# Patient Record
Sex: FEMALE | Race: WHITE | Employment: FULL TIME | URBAN - METROPOLITAN AREA
[De-identification: names, ages, dates, MRNs, and addresses within clinical notes are randomized per-mention and may not be internally consistent; named-entity substitution may affect disease eponyms.]

---

## 2017-01-10 ENCOUNTER — HOSPITAL ENCOUNTER (EMERGENCY)
Age: 42
Discharge: HOME | End: 2017-01-10
Attending: EMERGENCY MEDICINE

## 2017-01-10 VITALS
WEIGHT: 141 LBS | OXYGEN SATURATION: 99 % | SYSTOLIC BLOOD PRESSURE: 117 MMHG | BODY MASS INDEX: 24.98 KG/M2 | RESPIRATION RATE: 18 BRPM | HEIGHT: 63 IN | TEMPERATURE: 98.2 F | DIASTOLIC BLOOD PRESSURE: 78 MMHG | HEART RATE: 53 BPM

## 2017-01-10 DIAGNOSIS — H81.10 BPPV (BENIGN PAROXYSMAL POSITIONAL VERTIGO), UNSPECIFIED LATERALITY: Primary | ICD-10-CM

## 2017-01-10 LAB
ANION GAP BLD CALC-SCNC: 20 MMOL/L (ref 5–15)
BILIRUB UR QL: NEGATIVE
BUN BLD-MCNC: 21 MG/DL (ref 9–20)
CA-I BLD-MCNC: 1.27 MMOL/L (ref 1.12–1.32)
CHLORIDE BLD-SCNC: 101 MMOL/L (ref 98–107)
CO2 BLD-SCNC: 24 MMOL/L (ref 21–32)
CREAT BLD-MCNC: 0.7 MG/DL (ref 0.6–1.3)
GLUCOSE BLD-MCNC: 102 MG/DL (ref 65–105)
GLUCOSE UR QL STRIP.AUTO: NEGATIVE MG/DL
HCG UR QL: NEGATIVE
HCT VFR BLD CALC: 41 % (ref 35–47)
HGB BLD-MCNC: 13.9 GM/DL (ref 11.5–16)
KETONES UR-MCNC: 40 MG/DL
LEUKOCYTE ESTERASE UR QL STRIP: NEGATIVE
NITRITE UR QL: NEGATIVE
PH UR: 6.5 [PH] (ref 5–8)
POTASSIUM BLD-SCNC: 4.2 MMOL/L (ref 3.5–5.1)
PROT UR QL: ABNORMAL MG/DL
RBC # UR STRIP: NEGATIVE /UL
SERVICE CMNT-IMP: ABNORMAL
SODIUM BLD-SCNC: 140 MMOL/L (ref 136–145)
SP GR UR: 1.02 (ref 1–1.03)
UROBILINOGEN UR QL: 0.2 EU/DL (ref 0.2–1)

## 2017-01-10 RX ORDER — ONDANSETRON 2 MG/ML
4 INJECTION INTRAMUSCULAR; INTRAVENOUS ONCE
Status: DISCONTINUED | OUTPATIENT
Start: 2017-01-10 | End: 2017-01-10

## 2017-01-10 RX ORDER — ONDANSETRON 8 MG/1
8 TABLET, ORALLY DISINTEGRATING ORAL
Qty: 12 TAB | Refills: 0 | Status: SHIPPED | OUTPATIENT
Start: 2017-01-10 | End: 2017-08-18 | Stop reason: ALTCHOICE

## 2017-01-10 RX ORDER — ONDANSETRON 2 MG/ML
4 INJECTION INTRAMUSCULAR; INTRAVENOUS ONCE
Status: COMPLETED | OUTPATIENT
Start: 2017-01-10 | End: 2017-01-10

## 2017-01-10 RX ADMIN — ONDANSETRON 4 MG: 2 INJECTION INTRAMUSCULAR; INTRAVENOUS at 14:16

## 2017-01-10 NOTE — LETTER
[facility] 
[address] [address] 
[phone] Work/School Note Date: 1/10/2017 To Whom It May concern: 
 
Ary Rodriguez was seen and treated today in the emergency room by the following provider(s): 
Attending Provider: Pam Frost MD 
Physician Assistant: Lisa Wood, [address] may return to work on 01/12/17. Sincerely, Lisa Wood, [address]

## 2017-01-10 NOTE — UC PROVIDER NOTE
Patient is a 46 y.o. female presenting with dizziness and nausea. The history is provided by the patient. Dizziness   This is a new problem. The current episode started 6 to 12 hours ago. The problem has not changed since onset. There was no focality noted. Pertinent negatives include no focal weakness, no loss of balance, no slurred speech, no memory loss, no auditory change and no mental status change. There has been no fever. Associated symptoms include vomiting and nausea. Pertinent negatives include no confusion and no headaches. Associated medical issues do not include trauma or seizures. Nausea    Pertinent negatives include no chills, no headaches and no headaches. Past Medical History   Diagnosis Date    SLAP tear of shoulder      left        Past Surgical History   Procedure Laterality Date    Hx gyn  2011     bladder sling    Hx tonsil and adenoidectomy      Hx orthopaedic       left wrist tendon    Hx orthopaedic  Nov 2013     L SLAP tear repair    [facility]  Nov 2013     [facility]         Family History   Problem Relation Age of Onset    Heart Disease Father      CHF    Heart Disease Maternal Grandfather     Stroke Paternal Grandfather         Social History     Social History    Marital status:      Spouse name: N/A    Number of children: N/A    Years of education: N/A     Occupational History    Not on file. Social History Main Topics    Smoking status: Never Smoker    Smokeless tobacco: Never Used    Alcohol use No    Drug use: No    Sexual activity: Yes     Partners: Male     Birth control/ protection: Surgical     Other Topics Concern    Not on file     Social History Narrative                ALLERGIES: Review of patient's allergies indicates no known allergies. Review of Systems   Constitutional: Negative for chills. HENT: Negative for congestion. Gastrointestinal: Positive for nausea and vomiting.    Neurological: Positive for dizziness. Negative for focal weakness, headaches and loss of balance. Psychiatric/Behavioral: Negative for confusion and memory loss. Vitals:    01/10/17 1318   BP: 117/78   Pulse: (!) 53   Resp: 18   Temp: 98.2 °F (36.8 °C)   SpO2: 99%   Weight: 64 kg (141 lb)   Height: 5' 3\" (1.6 m)       Physical Exam   Constitutional: She is oriented to person, place, and time. She appears well-developed and well-nourished. HENT:   Right Ear: External ear normal.   Left Ear: External ear normal.   Eyes: EOM are normal.   Cardiovascular: Normal rate and regular rhythm. Pulmonary/Chest: Effort normal and breath sounds normal.   Neurological: She is alert and oriented to person, place, and time. Skin: Skin is warm and dry. Psychiatric: She has a normal mood and affect. Her behavior is normal. Judgment and thought content normal.   Nursing note and vitals reviewed. MDM     Differential Diagnosis; Clinical Impression; Plan:     CLINICAL IMPRESSION:  BPPV (benign paroxysmal positional vertigo), unspecified laterality  (primary encounter diagnosis)    Plan:  1. NS IV bolus given - improvement noted  2. Zofran 8 mg UAD  3. Stay well hydrated  Risk of Significant Complications, Morbidity, and/or Mortality:   Presenting problems: Moderate  Diagnostic procedures: Moderate  Management options:   Moderate  Progress:   Patient progress:  Stable      Procedures

## 2017-02-01 ENCOUNTER — OFFICE VISIT (OUTPATIENT)
Dept: FAMILY MEDICINE CLINIC | Age: 42
End: 2017-02-01

## 2017-02-01 VITALS
WEIGHT: 141 LBS | OXYGEN SATURATION: 99 % | TEMPERATURE: 97.3 F | HEIGHT: 63 IN | HEART RATE: 60 BPM | BODY MASS INDEX: 24.98 KG/M2 | RESPIRATION RATE: 18 BRPM

## 2017-02-01 DIAGNOSIS — Z00.00 ROUTINE GENERAL MEDICAL EXAMINATION AT A HEALTH CARE FACILITY: Primary | ICD-10-CM

## 2017-02-01 DIAGNOSIS — F43.23 ADJUSTMENT DISORDER WITH MIXED ANXIETY AND DEPRESSED MOOD: ICD-10-CM

## 2017-02-01 LAB
BILIRUB UR QL STRIP: NEGATIVE
GLUCOSE UR-MCNC: NEGATIVE MG/DL
KETONES P FAST UR STRIP-MCNC: NEGATIVE MG/DL
PH UR STRIP: 5.5 [PH] (ref 4.6–8)
PROT UR QL STRIP: NEGATIVE MG/DL
SP GR UR STRIP: 1.01 (ref 1–1.03)
UA UROBILINOGEN AMB POC: NORMAL (ref 0.2–1)
URINALYSIS CLARITY POC: CLEAR
URINALYSIS COLOR POC: YELLOW
URINE BLOOD POC: NORMAL
URINE LEUKOCYTES POC: NEGATIVE
URINE NITRITES POC: NEGATIVE

## 2017-02-01 RX ORDER — MELATONIN
DAILY
COMMUNITY

## 2017-02-01 RX ORDER — ALPRAZOLAM 0.5 MG/1
0.5 TABLET ORAL
Qty: 30 TAB | Refills: 0 | Status: SHIPPED | OUTPATIENT
Start: 2017-02-01 | End: 2017-11-13 | Stop reason: SDUPTHER

## 2017-02-01 NOTE — MR AVS SNAPSHOT
Visit Information Date & Time Provider Department Dept. Phone Encounter #  
 2/1/2017  2:00 PM Deng Willis, [address] [phone] [id number] Your Appointments 2/8/2017  9:00 AM  
COMPLETE PHYSICAL with Deng Willis  [facility] [address] ([facility] Appt Note: r/s from 12/23/16 for cpe, will cancel this appt if her appt on 2/1/17 is cancelled [address] [phone]  
[phone]  
  
   
 [address] [address] Upcoming Health Maintenance Date Due DTaP/Tdap/Td series (1 - Tdap) 10/10/1998 PAP AKA CERVICAL CYTOLOGY 1/15/2016 INFLUENZA AGE 9 TO ADULT 8/1/2016 Allergies as of 2/1/2017  Review Complete On: 2/1/2017 By: Tien Merritt LPN No Known Allergies Current Immunizations  Never Reviewed No immunizations on file. Not reviewed this visit You Were Diagnosed With   
  
 Codes Comments Routine general medical examination at a health care facility    -  Primary ICD-10-CM: Z00.00 ICD-9-CM: V70.0 Adjustment disorder with mixed anxiety and depressed mood     ICD-10-CM: F43.23 
ICD-9-CM: 309.28 Vitals Pulse Temp Resp Height(growth percentile) Weight(growth percentile) LMP  
 60 97.3 °F (36.3 °C) (Oral) 18 5' 3\" (1.6 m) 141 lb (64 kg) 01/31/2017 (Approximate) SpO2 BMI OB Status Smoking Status 99% 24.98 kg/m2 Having regular periods Never Smoker Vitals History BMI and BSA Data Body Mass Index Body Surface Area 24.98 kg/m 2 1.69 m 2 Preferred Pharmacy Pharmacy Name Phone [id number] [id number], [address] [phone] Your Updated Medication List  
  
   
This list is accurate as of: 2/1/17  2:30 PM.  Always use your most recent med list.  
  
  
  
  
 ALPRAZolam 0.5 mg tablet Commonly known as:  Jihan Abdi Take 1 Tab by mouth nightly as needed for Anxiety. Max Daily Amount: 0.5 mg.  
  
 ondansetron 8 mg disintegrating tablet Commonly known as:  ZOFRAN ODT Take 1 Tab by mouth every eight (8) hours as needed for Nausea. SUDAFED PO Take  by mouth. VITAMIN D3 1,000 unit tablet Generic drug:  cholecalciferol Take  by mouth daily. Prescriptions Printed Refills ALPRAZolam (XANAX) 0.5 mg tablet 0 Sig: Take 1 Tab by mouth nightly as needed for Anxiety. Max Daily Amount: 0.5 mg.  
 Class: Print Route: Oral  
  
We Performed the Following AMB POC URINALYSIS DIP STICK AUTO W/ MICRO [44636 CPT(R)] CBC WITH AUTOMATED DIFF [10286 CPT(R)] LIPID PANEL [20017 CPT(R)] METABOLIC PANEL, COMPREHENSIVE [31858 CPT(R)] TSH 3RD GENERATION [95028 CPT(R)] VITAMIN D, 25 HYDROXY [id number] CPT(R)] Introducing [facility]! Dear Juventino Hernández: Thank you for requesting a ConforMIS account. Our records indicate that you already have an active ConforMIS account. You can access your account anytime at [email or web address] Did you know that you can access your hospital and ER discharge instructions at any time in ConforMIS? You can also review all of your test results from your hospital stay or ER visit. Additional Information If you have questions, please visit the Frequently Asked Questions section of the ConforMIS website at [email or web address] Remember, ConforMIS is NOT to be used for urgent needs. For medical emergencies, dial 911. Now available from your iPhone and Android! Please provide this summary of care documentation to your next provider. Your primary care clinician is listed as JESSICA KAYE. If you have any questions after today's visit, please call [phone].

## 2017-02-01 NOTE — PROGRESS NOTES
Subjective:   46 y.o. female for Well Woman Check. Her gyne and breast care is done elsewhere by her Ob-Gyne physician. Patient Active Problem List    Diagnosis Date Noted    Adjustment disorder with mixed anxiety and depressed mood 05/17/2011    Seasonal allergic rhinitis 05/05/2011     Current Outpatient Prescriptions   Medication Sig Dispense Refill    cholecalciferol (VITAMIN D3) 1,000 unit tablet Take  by mouth daily.  ALPRAZolam (XANAX) 0.5 mg tablet Take 1 Tab by mouth nightly as needed for Anxiety. Max Daily Amount: 0.5 mg. 30 Tab 0    PSEUDOEPHEDRINE HCL (SUDAFED PO) Take  by mouth.  ondansetron (ZOFRAN ODT) 8 mg disintegrating tablet Take 1 Tab by mouth every eight (8) hours as needed for Nausea. 12 Tab 0     No Known Allergies  Past Medical History   Diagnosis Date    SLAP tear of shoulder      left     Past Surgical History   Procedure Laterality Date    Hx gyn  2011     bladder sling    Hx tonsil and adenoidectomy      Hx orthopaedic       left wrist tendon    Hx orthopaedic  Nov 2013     L SLAP tear repair    H[facility]  Nov 2013     [facility]     Family History   Problem Relation Age of Onset    Heart Disease Father      CHF    Heart Disease Maternal Grandfather     Stroke Paternal Grandfather      Social History   Substance Use Topics    Smoking status: Never Smoker    Smokeless tobacco: Never Used    Alcohol use No         ROS: Feeling generally well. No TIA's or unusual headaches, no dysphagia. No prolonged cough. No dyspnea or chest pain on exertion. No abdominal pain, change in bowel habits, black or bloody stools. No urinary tract symptoms. No new or unusual musculoskeletal symptoms. Specific concerns today: has a erythematous dry area on her face for a month, used fungal cream without help  Requesting to refill her xanax, takes only 2-3 per month for anxiety. Objective:    The patient appears well, alert, oriented x 3, in no distress. Visit Vitals    Pulse 60    Temp 97.3 °F (36.3 °C) (Oral)    Resp 18    Ht 5' 3\" (1.6 m)    Wt 141 lb (64 kg)    LMP 01/31/2017 (Approximate)    SpO2 99%    BMI 24.98 kg/m2     ENT normal.  Neck supple. No adenopathy or thyromegaly. VIRGIE. Lungs are clear, good air entry, no wheezes, rhonchi or rales. S1 and S2 normal, no murmurs, regular rate and rhythm. Abdomen soft without tenderness, guarding, mass or organomegaly. Extremities show no edema, normal peripheral pulses. Neurological is normal, no focal findings. Breast and Pelvic exams are deferred. Assessment/Plan:   Well Woman  continue present plan    ICD-10-CM ICD-9-CM    1. Routine general medical examination at a health care facility F51.41 A52.6 METABOLIC PANEL, COMPREHENSIVE      TSH 3RD GENERATION      LIPID PANEL      VITAMIN D, 25 HYDROXY      CBC WITH AUTOMATED DIFF      AMB POC URINALYSIS DIP STICK AUTO W/ MICRO   2. Adjustment disorder with mixed anxiety and depressed mood F43.23 309.28 ALPRAZolam (XANAX) 0.5 mg tablet   await labs, refill medication  Advised to apply cortisone 10, call if not improved.   Pt was given an after visit summary which includes diagnosis, current medicines and vital and voiced understanding of treatment plan

## 2017-02-01 NOTE — PROGRESS NOTES
\"Reviewed record in preparation for visit and have obtained the necessary documentation\"  Chief Complaint   Patient presents with    Complete Physical    Rash     to right side of face        Patient presents in the office today for a complete physical     Patient also has complaints of \"dry skin,fungus\" to right side of face under her nose,recurrent for several months     Prescription for Xanax 0.5 mg called in to local [facility] pharmacy on file, per GABINO Sierra verbal order         1. Have you been to the ER, urgent care clinic since your last visit? Hospitalized since your last visit? No    2. Have you seen or consulted any other health care providers outside of the [address] [place] [address] since your last visit? Include any pap smears or colon screening.  No

## 2017-04-15 LAB
25(OH)D3+25(OH)D2 SERPL-MCNC: 27.3 NG/ML (ref 30–100)
ALBUMIN SERPL-MCNC: 4.7 G/DL (ref 3.5–5.5)
ALBUMIN/GLOB SERPL: 2 {RATIO} (ref 1.2–2.2)
ALP SERPL-CCNC: 50 IU/L (ref 39–117)
ALT SERPL-CCNC: 27 IU/L (ref 0–32)
AST SERPL-CCNC: 23 IU/L (ref 0–40)
BASOPHILS # BLD AUTO: 0 X10E3/UL (ref 0–0.2)
BASOPHILS NFR BLD AUTO: 0 %
BILIRUB SERPL-MCNC: 0.4 MG/DL (ref 0–1.2)
BUN SERPL-MCNC: 17 MG/DL (ref 6–20)
BUN/CREAT SERPL: 21 (ref 9–23)
CALCIUM SERPL-MCNC: 9.2 MG/DL (ref 8.7–10.2)
CHLORIDE SERPL-SCNC: 100 MMOL/L (ref 96–106)
CHOLEST SERPL-MCNC: 180 MG/DL (ref 100–199)
CO2 SERPL-SCNC: 21 MMOL/L (ref 18–29)
CREAT SERPL-MCNC: 0.82 MG/DL (ref 0.57–1)
EOSINOPHIL # BLD AUTO: 0.1 X10E3/UL (ref 0–0.4)
EOSINOPHIL NFR BLD AUTO: 1 %
ERYTHROCYTE [DISTWIDTH] IN BLOOD BY AUTOMATED COUNT: 13.2 % (ref 12.3–15.4)
GLOBULIN SER CALC-MCNC: 2.3 G/DL (ref 1.5–4.5)
GLUCOSE SERPL-MCNC: 95 MG/DL (ref 65–99)
HCT VFR BLD AUTO: 38.5 % (ref 34–46.6)
HDLC SERPL-MCNC: 84 MG/DL
HGB BLD-MCNC: 12.9 G/DL (ref 11.1–15.9)
IMM GRANULOCYTES # BLD: 0 X10E3/UL (ref 0–0.1)
IMM GRANULOCYTES NFR BLD: 0 %
INTERPRETATION, 910389: NORMAL
LDLC SERPL CALC-MCNC: 90 MG/DL (ref 0–99)
LYMPHOCYTES # BLD AUTO: 2.2 X10E3/UL (ref 0.7–3.1)
LYMPHOCYTES NFR BLD AUTO: 31 %
MCH RBC QN AUTO: 30.2 PG (ref 26.6–33)
MCHC RBC AUTO-ENTMCNC: 33.5 G/DL (ref 31.5–35.7)
MCV RBC AUTO: 90 FL (ref 79–97)
MONOCYTES # BLD AUTO: 0.5 X10E3/UL (ref 0.1–0.9)
MONOCYTES NFR BLD AUTO: 6 %
NEUTROPHILS # BLD AUTO: 4.3 X10E3/UL (ref 1.4–7)
NEUTROPHILS NFR BLD AUTO: 62 %
PLATELET # BLD AUTO: 270 X10E3/UL (ref 150–379)
POTASSIUM SERPL-SCNC: 4.5 MMOL/L (ref 3.5–5.2)
PROT SERPL-MCNC: 7 G/DL (ref 6–8.5)
RBC # BLD AUTO: 4.27 X10E6/UL (ref 3.77–5.28)
SODIUM SERPL-SCNC: 138 MMOL/L (ref 134–144)
TRIGL SERPL-MCNC: 30 MG/DL (ref 0–149)
TSH SERPL DL<=0.005 MIU/L-ACNC: 2.16 UIU/ML (ref 0.45–4.5)
VLDLC SERPL CALC-MCNC: 6 MG/DL (ref 5–40)
WBC # BLD AUTO: 7.1 X10E3/UL (ref 3.4–10.8)

## 2017-08-18 ENCOUNTER — OFFICE VISIT (OUTPATIENT)
Dept: FAMILY MEDICINE CLINIC | Age: 42
End: 2017-08-18

## 2017-08-18 VITALS
OXYGEN SATURATION: 97 % | HEART RATE: 68 BPM | RESPIRATION RATE: 18 BRPM | SYSTOLIC BLOOD PRESSURE: 121 MMHG | BODY MASS INDEX: 25.41 KG/M2 | TEMPERATURE: 97.3 F | HEIGHT: 63 IN | DIASTOLIC BLOOD PRESSURE: 70 MMHG | WEIGHT: 143.4 LBS

## 2017-08-18 DIAGNOSIS — N30.01 ACUTE CYSTITIS WITH HEMATURIA: ICD-10-CM

## 2017-08-18 DIAGNOSIS — R30.0 DYSURIA: Primary | ICD-10-CM

## 2017-08-18 LAB
BILIRUB UR QL STRIP: NEGATIVE
GLUCOSE UR-MCNC: NEGATIVE MG/DL
KETONES P FAST UR STRIP-MCNC: NEGATIVE MG/DL
PH UR STRIP: 6 [PH] (ref 4.6–8)
PROT UR QL STRIP: NEGATIVE MG/DL
SP GR UR STRIP: 1.01 (ref 1–1.03)
UA UROBILINOGEN AMB POC: NORMAL (ref 0.2–1)
URINALYSIS CLARITY POC: CLEAR
URINALYSIS COLOR POC: YELLOW
URINE BLOOD POC: NORMAL
URINE LEUKOCYTES POC: NORMAL
URINE NITRITES POC: NEGATIVE

## 2017-08-18 RX ORDER — CIPROFLOXACIN 250 MG/1
250 TABLET, FILM COATED ORAL EVERY 12 HOURS
Qty: 6 TAB | Refills: 0 | Status: SHIPPED | OUTPATIENT
Start: 2017-08-18 | End: 2017-08-21

## 2017-08-18 NOTE — MR AVS SNAPSHOT
Visit Information Date & Time Provider Department Dept. Phone Encounter #  
 8/18/2017  2:00 PM Lon Ghosh  [facility] [phone] [id number] Follow-up Instructions Return if symptoms worsen or fail to improve. Upcoming Health Maintenance Date Due DTaP/Tdap/Td series (1 - Tdap) 10/10/1998 PAP AKA CERVICAL CYTOLOGY 1/15/2016 INFLUENZA AGE 9 TO ADULT 8/1/2017 Allergies as of 8/18/2017  Review Complete On: 8/18/2017 By: Lon Ghosh NP No Known Allergies Current Immunizations  Never Reviewed No immunizations on file. Not reviewed this visit You Were Diagnosed With   
  
 Codes Comments Dysuria    -  Primary ICD-10-CM: R30.0 ICD-9-CM: 611. 1 Acute cystitis with hematuria     ICD-10-CM: N30.01 
ICD-9-CM: 595.0 Vitals BP Pulse Temp Resp Height(growth percentile) Weight(growth percentile) 121/70 (BP 1 Location: Left arm, BP Patient Position: Sitting) 68 97.3 °F (36.3 °C) (Oral) 18 5' 3\" (1.6 m) 143 lb 6.4 oz (65 kg) LMP SpO2 BMI OB Status Smoking Status 08/07/2017 (Exact Date) 97% 25.4 kg/m2 Having regular periods Never Smoker Vitals History BMI and BSA Data Body Mass Index Body Surface Area  
 25.4 kg/m 2 1.7 m 2 Preferred Pharmacy [facility]acy Name Phone [id number] [id number] [address] [phone] Your Updated Medication List  
  
   
This list is accurate as of: 8/18/17  2:35 PM.  Always use your most recent med list.  
  
  
  
  
 ALPRAZolam 0.5 mg tablet Commonly known as:  Murali Papa Take 1 Tab by mouth nightly as needed for Anxiety. Max Daily Amount: 0.5 mg.  
  
 ciprofloxacin HCl 250 mg tablet Commonly known as:  CIPRO Take 1 Tab by mouth every twelve (12) hours for 3 days. VITAMIN D3 1,000 unit tablet Generic drug:  cholecalciferol Take  by mouth daily. Prescriptions Sent to Pharmacy Refills  
 ciprofloxacin HCl (CIPRO) 250 mg tablet 0 Sig: Take 1 Tab by mouth every twelve (12) hours for 3 days. Class: Normal  
 Pharmacy: [facility], [address] [place] [address] Ph #: [phone] Route: Oral  
  
We Performed the Following AMB POC URINALYSIS DIP STICK AUTO W/O MICRO [28032 CPT(R)] CULTURE, URINE [id number] CPT(R)] Follow-up Instructions Return if symptoms worsen or fail to improve. Patient Instructions Urinary Tract Infection in Women: Care Instructions Your Care Instructions A urinary tract infection, or UTI, is a general term for an infection anywhere between the kidneys and the urethra (where urine comes out). Most UTIs are bladder infections. They often cause pain or burning when you urinate. UTIs are caused by bacteria and can be cured with antibiotics. Be sure to complete your treatment so that the infection goes away. Follow-up care is a key part of your treatment and safety. Be sure to make and go to all appointments, and call your doctor if you are having problems. It's also a good idea to know your test results and keep a list of the medicines you take. How can you care for yourself at home? · Take your antibiotics as directed. Do not stop taking them just because you feel better. You need to take the full course of antibiotics. · Drink extra water and other fluids for the next day or two. This may help wash out the bacteria that are causing the infection. (If you have kidney, heart, or liver disease and have to limit fluids, talk with your doctor before you increase your fluid intake.) · Avoid drinks that are carbonated or have caffeine. They can irritate the bladder. · Urinate often. Try to empty your bladder each time.  
· To relieve pain, take a hot bath or lay a heating pad set on low over your lower belly or genital area. Never go to sleep with a heating pad in place. To prevent UTIs · Drink plenty of water each day. This helps you urinate often, which clears bacteria from your system. (If you have kidney, heart, or liver disease and have to limit fluids, talk with your doctor before you increase your fluid intake.) · Urinate when you need to. · Urinate right after you have sex. · Change sanitary pads often. · Avoid douches, bubble baths, feminine hygiene sprays, and other feminine hygiene products that have deodorants. · After going to the bathroom, wipe from front to back. When should you call for help? Call your doctor now or seek immediate medical care if: · Symptoms such as fever, chills, nausea, or vomiting get worse or appear for the first time. · You have new pain in your back just below your rib cage. This is called flank pain. · There is new blood or pus in your urine. · You have any problems with your antibiotic medicine. Watch closely for changes in your health, and be sure to contact your doctor if: 
· You are not getting better after taking an antibiotic for 2 days. · Your symptoms go away but then come back. Where can you learn more? Go to http://cornell-zainab.info/. Enter R007 in the search box to learn more about \"Urinary Tract Infection in Women: Care Instructions. \" Current as of: November 28, 2016 Content Version: 11.3 © 6628-1823 [employer]ia. Care instructions adapted under license by [employer] (which disclaims liability or warranty for this information). If you have questions about a medical condition or this instruction, always ask your healthcare professional. [place] [address] any warranty or liability for your use of this information. Introducing [facility]! Dear Herson Hinojosa: Thank you for requesting a "[employer] account.   Our records indicate that you already have an active [employer] Entertainment account. You can access your account anytime at [email or web address] Did you know that you can access your hospital and ER discharge instructions at any time in [employer]? You can also review all of your test results from your hospital stay or ER visit. Additional Information If you have questions, please visit the Frequently Asked Questions section of the [employer] website at [email or web address] Remember, [employer] is NOT to be used for urgent needs. For medical emergencies, dial 911. Now available from your iPhone and Android! Please provide this summary of care documentation to your next provider. Your primary care clinician is listed as Umberto LERMA. If you have any questions after today's visit, please call [phone].

## 2017-08-18 NOTE — PATIENT INSTRUCTIONS
Urinary Tract Infection in Women: Care Instructions  Your Care Instructions    A urinary tract infection, or UTI, is a general term for an infection anywhere between the kidneys and the urethra (where urine comes out). Most UTIs are bladder infections. They often cause pain or burning when you urinate. UTIs are caused by bacteria and can be cured with antibiotics. Be sure to complete your treatment so that the infection goes away. Follow-up care is a key part of your treatment and safety. Be sure to make and go to all appointments, and call your doctor if you are having problems. It's also a good idea to know your test results and keep a list of the medicines you take. How can you care for yourself at home? · Take your antibiotics as directed. Do not stop taking them just because you feel better. You need to take the full course of antibiotics. · Drink extra water and other fluids for the next day or two. This may help wash out the bacteria that are causing the infection. (If you have kidney, heart, or liver disease and have to limit fluids, talk with your doctor before you increase your fluid intake.)  · Avoid drinks that are carbonated or have caffeine. They can irritate the bladder. · Urinate often. Try to empty your bladder each time. · To relieve pain, take a hot bath or lay a heating pad set on low over your lower belly or genital area. Never go to sleep with a heating pad in place. To prevent UTIs  · Drink plenty of water each day. This helps you urinate often, which clears bacteria from your system. (If you have kidney, heart, or liver disease and have to limit fluids, talk with your doctor before you increase your fluid intake.)  · Urinate when you need to. · Urinate right after you have sex. · Change sanitary pads often. · Avoid douches, bubble baths, feminine hygiene sprays, and other feminine hygiene products that have deodorants.   · After going to the bathroom, wipe from front to back.  When should you call for help? Call your doctor now or seek immediate medical care if:  · Symptoms such as fever, chills, nausea, or vomiting get worse or appear for the first time. · You have new pain in your back just below your rib cage. This is called flank pain. · There is new blood or pus in your urine. · You have any problems with your antibiotic medicine. Watch closely for changes in your health, and be sure to contact your doctor if:  · You are not getting better after taking an antibiotic for 2 days. · Your symptoms go away but then come back. Where can you learn more? Go to http://cornell-zainab.info/. Enter R632 in the search box to learn more about \"Urinary Tract Infection in Women: Care Instructions. \"  Current as of: November 28, 2016  Content Version: 11.3  © 4024-4235 [employer], [employer]ont[employer]. Care instructions adapted under license by [employer] (which disclaims liability or warranty for this information). If you have questions about a medical condition or this instruction, always ask your healthcare professional. [address] any warranty or liability for your use of this information.

## 2017-08-18 NOTE — PROGRESS NOTES
Chief Complaint   Patient presents with    Urinary Burning     urinary burning, frequent urination, pelvic pain with intercourse X 2 weeks     Vaginal Discharge     X 2 weeks      1. Have you been to the ER, urgent care clinic since your last visit? Hospitalized since your last visit? No    2. Have you seen or consulted any other health care providers outside of the [address] [place] [address] since your last visit? Include any pap smears or colon screening.  No

## 2017-08-19 LAB — BACTERIA UR CULT: ABNORMAL

## 2017-08-23 NOTE — PROGRESS NOTES
Subjective:      Lala Gaines is a 46 y.o. female who presents for dysuria and vaginal burning. Symptoms are worsening over time. Denies associated flank pain, nausea, vomiting, fever, chills or body aches. No history of recurrent UTI. Some associated pain with sex. Current Outpatient Prescriptions   Medication Sig Dispense Refill    cholecalciferol (VITAMIN D3) 1,000 unit tablet Take  by mouth daily.  ALPRAZolam (XANAX) 0.5 mg tablet Take 1 Tab by mouth nightly as needed for Anxiety. Max Daily Amount: 0.5 mg. 30 Tab 0     No Known Allergies    ROS:   Complete review of systems was reviewed with pertinent information listed in HPI. Objective:     Visit Vitals    /70 (BP 1 Location: Left arm, BP Patient Position: Sitting)    Pulse 68    Temp 97.3 °F (36.3 °C) (Oral)    Resp 18    Ht 5' 3\" (1.6 m)    Wt 143 lb 6.4 oz (65 kg)    LMP 08/07/2017 (Exact Date)    SpO2 97%    BMI 25.4 kg/m2       Vitals and Nurse Documentation reviewed. General:  alert, cooperative, no distress       Assessment/Plan:     Diagnoses and all orders for this visit:    1. Dysuria  -     AMB POC URINALYSIS DIP STICK AUTO W/O MICRO  -     CULTURE, URINE    2. Acute cystitis with hematuria  -     ciprofloxacin HCl (CIPRO) 250 mg tablet; Take 1 Tab by mouth every twelve (12) hours for 3 days. Treatment as above. Follow up if no improvement. Culture pending. Discussed expected course/resolution/complications of diagnosis in detail with patient.    Medication risks/benefits/costs/interactions/alternatives discussed with patient.    Pt was given an after visit summary which includes diagnoses, current medications & vitals.    Pt expressed understanding with the diagnosis and plan    Follow-up Disposition:  Return if symptoms worsen or fail to improve.

## 2017-08-24 ENCOUNTER — OFFICE VISIT (OUTPATIENT)
Dept: FAMILY MEDICINE CLINIC | Age: 42
End: 2017-08-24

## 2017-08-24 VITALS
BODY MASS INDEX: 25.34 KG/M2 | DIASTOLIC BLOOD PRESSURE: 60 MMHG | HEART RATE: 60 BPM | WEIGHT: 143 LBS | SYSTOLIC BLOOD PRESSURE: 116 MMHG | TEMPERATURE: 97.3 F | OXYGEN SATURATION: 98 % | HEIGHT: 63 IN | RESPIRATION RATE: 16 BRPM

## 2017-08-24 DIAGNOSIS — N30.00 ACUTE CYSTITIS WITHOUT HEMATURIA: Primary | ICD-10-CM

## 2017-08-24 RX ORDER — AMOXICILLIN AND CLAVULANATE POTASSIUM 500; 125 MG/1; MG/1
1 TABLET, FILM COATED ORAL 2 TIMES DAILY
Qty: 14 TAB | Refills: 0 | Status: SHIPPED | OUTPATIENT
Start: 2017-08-24 | End: 2017-08-31

## 2017-08-24 NOTE — PATIENT INSTRUCTIONS
Urinary Tract Infection in Women: Care Instructions  Your Care Instructions    A urinary tract infection, or UTI, is a general term for an infection anywhere between the kidneys and the urethra (where urine comes out). Most UTIs are bladder infections. They often cause pain or burning when you urinate. UTIs are caused by bacteria and can be cured with antibiotics. Be sure to complete your treatment so that the infection goes away. Follow-up care is a key part of your treatment and safety. Be sure to make and go to all appointments, and call your doctor if you are having problems. It's also a good idea to know your test results and keep a list of the medicines you take. How can you care for yourself at home? · Take your antibiotics as directed. Do not stop taking them just because you feel better. You need to take the full course of antibiotics. · Drink extra water and other fluids for the next day or two. This may help wash out the bacteria that are causing the infection. (If you have kidney, heart, or liver disease and have to limit fluids, talk with your doctor before you increase your fluid intake.)  · Avoid drinks that are carbonated or have caffeine. They can irritate the bladder. · Urinate often. Try to empty your bladder each time. · To relieve pain, take a hot bath or lay a heating pad set on low over your lower belly or genital area. Never go to sleep with a heating pad in place. To prevent UTIs  · Drink plenty of water each day. This helps you urinate often, which clears bacteria from your system. (If you have kidney, heart, or liver disease and have to limit fluids, talk with your doctor before you increase your fluid intake.)  · Urinate when you need to. · Urinate right after you have sex. · Change sanitary pads often. · Avoid douches, bubble baths, feminine hygiene sprays, and other feminine hygiene products that have deodorants.   · After going to the bathroom, wipe from front to back.  When should you call for help? Call your doctor now or seek immediate medical care if:  · Symptoms such as fever, chills, nausea, or vomiting get worse or appear for the first time. · You have new pain in your back just below your rib cage. This is called flank pain. · There is new blood or pus in your urine. · You have any problems with your antibiotic medicine. Watch closely for changes in your health, and be sure to contact your doctor if:  · You are not getting better after taking an antibiotic for 2 days. · Your symptoms go away but then come back. Where can you learn more? Go to http://cornell-zainab.info/. Enter B363 in the search box to learn more about \"Urinary Tract Infection in Women: Care Instructions. \"  Current as of: November 28, 2016  Content Version: 11.3  © 9257-7838 "[employer].", Steeplechase Networks. Care instructions adapted under license by Connect (which disclaims liability or warranty for this information). If you have questions about a medical condition or this instruction, always ask your healthcare professional. [address] any warranty or liability for your use of this information.

## 2017-08-24 NOTE — PROGRESS NOTES
Pt presents to office today for a follow up on Dysuria. Pt states she is done taking her Cipro, and still not feeling not feeling better. Chief Complaint   Patient presents with    Bladder Infection     Follow up . 1. Have you been to the ER, urgent care clinic since your last visit? Hospitalized since your last visit? No    2. Have you seen or consulted any other health care providers outside of the [address] [place] [address] since your last visit? Include any pap smears or colon screening.  No

## 2017-08-24 NOTE — MR AVS SNAPSHOT
Visit Information Date & Time Provider Department Dept. Phone Encounter #  
 8/24/2017  4:00 PM Anabell Strickland MD [address] [place] [address] [phone] [id number] Follow-up Instructions Return if symptoms worsen or fail to improve. Upcoming Health Maintenance Date Due DTaP/Tdap/Td series (1 - Tdap) 10/10/1998 PAP AKA CERVICAL CYTOLOGY 1/15/2016 INFLUENZA AGE 9 TO ADULT 8/1/2017 Allergies as of 8/24/2017  Review Complete On: 8/24/2017 By: Anabell Strickland MD  
 No Known Allergies Current Immunizations  Never Reviewed No immunizations on file. Not reviewed this visit You Were Diagnosed With   
  
 Codes Comments Acute cystitis without hematuria    -  Primary ICD-10-CM: N30.00 ICD-9-CM: 595.0 Vitals BP Pulse Temp Resp Height(growth percentile) Weight(growth percentile) 116/60 60 97.3 °F (36.3 °C) (Oral) 16 5' 3\" (1.6 m) 143 lb (64.9 kg) LMP SpO2 BMI OB Status Smoking Status 08/07/2017 (Exact Date) 98% 25.33 kg/m2 Having regular periods Never Smoker Vitals History BMI and BSA Data Body Mass Index Body Surface Area  
 25.33 kg/m 2 1.7 m 2 Preferred Pharmacy Pharmacy Name Phone [id number], [address] [facility] [phone] Your Updated Medication List  
  
   
This list is accurate as of: 8/24/17  4:42 PM.  Always use your most recent med list.  
  
  
  
  
 ALPRAZolam 0.5 mg tablet Commonly known as:  Rula Hollins Take 1 Tab by mouth nightly as needed for Anxiety. Max Daily Amount: 0.5 mg.  
  
 amoxicillin-clavulanate 500-125 mg per tablet Commonly known as:  AUGMENTIN Take 1 Tab by mouth two (2) times a day for 7 days. VITAMIN D3 1,000 unit tablet Generic drug:  cholecalciferol Take  by mouth daily. Prescriptions Sent to Pharmacy Refills amoxicillin-clavulanate (AUGMENTIN) 500-125 mg per tablet 0 Sig: Take 1 Tab by mouth two (2) times a day for 7 days. Class: Normal  
 Pharmacy: [id number] [place], [address] [place] [address] #: [phone] Route: Oral  
  
We Performed the Following AMB POC URINALYSIS DIP STICK AUTO W/O MICRO [68326 CPT(R)] Follow-up Instructions Return if symptoms worsen or fail to improve. Patient Instructions Urinary Tract Infection in Women: Care Instructions Your Care Instructions A urinary tract infection, or UTI, is a general term for an infection anywhere between the kidneys and the urethra (where urine comes out). Most UTIs are bladder infections. They often cause pain or burning when you urinate. UTIs are caused by bacteria and can be cured with antibiotics. Be sure to complete your treatment so that the infection goes away. Follow-up care is a key part of your treatment and safety. Be sure to make and go to all appointments, and call your doctor if you are having problems. It's also a good idea to know your test results and keep a list of the medicines you take. How can you care for yourself at home? · Take your antibiotics as directed. Do not stop taking them just because you feel better. You need to take the full course of antibiotics. · Drink extra water and other fluids for the next day or two. This may help wash out the bacteria that are causing the infection. (If you have kidney, heart, or liver disease and have to limit fluids, talk with your doctor before you increase your fluid intake.) · Avoid drinks that are carbonated or have caffeine. They can irritate the bladder. · Urinate often. Try to empty your bladder each time. · To relieve pain, take a hot bath or lay a heating pad set on low over your lower belly or genital area. Never go to sleep with a heating pad in place. To prevent UTIs · Drink plenty of water each day. This helps you urinate often, which clears bacteria from your system. (If you have kidney, heart, or liver disease and have to limit fluids, talk with your doctor before you increase your fluid intake.) · Urinate when you need to. · Urinate right after you have sex. · Change sanitary pads often. · Avoid douches, bubble baths, feminine hygiene sprays, and other feminine hygiene products that have deodorants. · After going to the bathroom, wipe from front to back. When should you call for help? Call your doctor now or seek immediate medical care if: · Symptoms such as fever, chills, nausea, or vomiting get worse or appear for the first time. · You have new pain in your back just below your rib cage. This is called flank pain. · There is new blood or pus in your urine. · You have any problems with your antibiotic medicine. Watch closely for changes in your health, and be sure to contact your doctor if: 
· You are not getting better after taking an antibiotic for 2 days. · Your symptoms go away but then come back. Where can you learn more? Go to http://cornell-zainab.info/. Enter X343 in the search box to learn more about \"Urinary Tract Infection in Women: Care Instructions. \" Current as of: November 28, 2016 Content Version: 11.3 © 1210-8261 Haversack. Care instructions adapted under license by DoNation (which disclaims liability or warranty for this information). If you have questions about a medical condition or this instruction, always ask your healthcare professional. [place] [address] any warranty or liability for your use of this information. Introducing [facility] & HEALTH SERVICES! Dear Denise Llanos: Thank you for requesting a Lynxx Innovations account. Our records indicate that you already have an active Lyn[employer] Innov[employer] account. You can access your account anytime at [email or web address] Did you know that you can access your hospital and ER discharge instructions at any time in BlueRoads? You can also review all of your test results from your hospital stay or ER visit. Additional Information If you have questions, please visit the Frequently Asked Questions section of the BlueRoads website at [email or web address] Remember, BlueRoads is NOT to be used for urgent needs. For medical emergencies, dial 911. Now available from your iPhone and Android! Please provide this summary of care documentation to your next provider. Your primary care clinician is listed as Sourav LERMA. If you have any questions after today's visit, please call [phone].

## 2017-08-24 NOTE — PROGRESS NOTES
[facility] Note      Subjective:     Chief Complaint   Patient presents with    Bladder Infection     Follow up . Braeden Tavarez is a 46y.o. year old female who presents for evaluation of the following:    Dysuria: about one week of symptoms. Seen by provider and treated for UTI with ciprofloxacin x 3 days which patient completed with no change in symptoms. Culture with GBS on chart review. - Endorses vaginal discharge present before symptom onset. - Denies flank pain, fever, chills rash., medication allergy, concern for pregnancy. Review of Systems   Per HPI    Past Medical History:   Diagnosis Date    SLAP tear of shoulder     left        Social History     Social History    Marital status:      Spouse name: N/A    Number of children: N/A    Years of education: N/A     Occupational History    Not on file. Social History Main Topics    Smoking status: Never Smoker    Smokeless tobacco: Never Used    Alcohol use Yes    Drug use: No    Sexual activity: Yes     Partners: Male     Birth control/ protection: Surgical     Other Topics Concern    Not on file     Social History Narrative         Objective:     Vitals:    08/24/17 1614   BP: 116/60   Pulse: 60   Resp: 16   Temp: 97.3 °F (36.3 °C)   TempSrc: Oral   SpO2: 98%   Weight: 143 lb (64.9 kg)   Height: 5' 3\" (1.6 m)       Physical Examination:  General: Alert, cooperative, no distress, appears stated age. Eyes: Conjunctivae/corneas clear. PERRL, EOMs intact. Ears: Normal external ear canals both ears. Neck: Supple  Back: Symmetric, no curvature. ROM normal. No CVA tenderness. Lungs: Clear to auscultation bilaterally. Normal inspiratory and expiratory ratio. Heart: Regular rate and rhythm, S1, S2 normal, no murmur, click, rub or gallop. Abdomen: Soft, Mild suprapubic tenderness. Bowel sounds normal.  Extremities: Extremities normal, atraumatic, no cyanosis or edema.   Pulses: 2+ and symmetric all extremities. Skin: Skin color, texture, turgor normal. No rashes or lesions      Assessment/ Plan:   Diagnoses and all orders for this visit:    1. Acute cystitis without hematuria: Will treat for GBS with Augmentin full course. - Urine dip not available in office so will confirm with culture since may have been contaminant.   - Patient to RTC if no improvement in sx  -     amoxicillin-clavulanate (AUGMENTIN) 500-125 mg per tablet; Take 1 Tab by mouth two (2) times a day for 7 days.  -     AMB POC URINALYSIS DIP STICK AUTO W/O MICRO  -     CULTURE, URINE         I have discussed the diagnosis with the patient and the intended plan as seen in the above orders. The patient has received an after-visit summary and questions were answered concerning future plans. I have discussed medication side effects and warnings with the patient as well. Follow-up Disposition:  Return if symptoms worsen or fail to improve.       Signed,    Osvaldo Reis MD  8/24/2017

## 2017-08-25 LAB — BACTERIA UR CULT: ABNORMAL

## 2017-11-13 DIAGNOSIS — F43.23 ADJUSTMENT DISORDER WITH MIXED ANXIETY AND DEPRESSED MOOD: ICD-10-CM

## 2017-11-13 RX ORDER — ALPRAZOLAM 0.5 MG/1
0.5 TABLET ORAL
Qty: 30 TAB | Refills: 0 | Status: SHIPPED | OUTPATIENT
Start: 2017-11-13 | End: 2018-11-28 | Stop reason: SDUPTHER

## 2017-11-13 NOTE — TELEPHONE ENCOUNTER
[phone]   Pharmacy on file verified   Refill request:  Requested Prescriptions     Pending Prescriptions Disp Refills    ALPRAZolam (XANAX) 0.5 mg tablet 30 Tab 0     Sig: Take 1 Tab by mouth nightly as needed for Anxiety.  Max Daily Amount: 0.5 mg.

## 2018-02-26 ENCOUNTER — OFFICE VISIT (OUTPATIENT)
Dept: FAMILY MEDICINE CLINIC | Age: 43
End: 2018-02-26

## 2018-02-26 VITALS
SYSTOLIC BLOOD PRESSURE: 113 MMHG | DIASTOLIC BLOOD PRESSURE: 74 MMHG | HEART RATE: 58 BPM | RESPIRATION RATE: 18 BRPM | TEMPERATURE: 98.2 F | OXYGEN SATURATION: 99 % | HEIGHT: 63 IN | WEIGHT: 140.6 LBS | BODY MASS INDEX: 24.91 KG/M2

## 2018-02-26 DIAGNOSIS — J01.00 ACUTE MAXILLARY SINUSITIS, RECURRENCE NOT SPECIFIED: Primary | ICD-10-CM

## 2018-02-26 RX ORDER — ESCITALOPRAM OXALATE 10 MG/1
TABLET ORAL
Refills: 3 | COMMUNITY
Start: 2018-02-15 | End: 2019-01-18

## 2018-02-26 NOTE — MR AVS SNAPSHOT
[address] 
[phone] Patient: Justin Dia MRN: [id number] :1975 Visit Information Date & Time Provider Department Dept. Phone Encounter #  
 2018  8:00 AM Tiff Landry  [facility] [phone] [id number] Follow-up Instructions Return if symptoms worsen or fail to improve, for Follow Up. Upcoming Health Maintenance Date Due DTaP/Tdap/Td series (1 - Tdap) 10/10/1998 PAP AKA CERVICAL CYTOLOGY 1/15/2016 Influenza Age 5 to Adult 2017 Allergies as of 2018  Review Complete On: 2018 By: Reena Chow LPN No Known Allergies Current Immunizations  Never Reviewed No immunizations on file. Not reviewed this visit You Were Diagnosed With   
  
 Codes Comments Acute maxillary sinusitis, recurrence not specified    -  Primary ICD-10-CM: J01.00 ICD-9-CM: 461.0 Vitals BP Pulse Temp Resp Height(growth percentile) Weight(growth percentile) 113/74 (BP 1 Location: Left arm, BP Patient Position: Sitting) (!) 58 98.2 °F (36.8 °C) (Oral) 18 5' 3\" (1.6 m) 140 lb 9.6 oz (63.8 kg) LMP SpO2 BMI OB Status Smoking Status 2018 (Exact Date) 99% 24.91 kg/m2 Having regular periods Never Smoker BMI and BSA Data Body Mass Index Body Surface Area 24.91 kg/m 2 1.68 m 2 Preferred Pharmacy Pharmacy Name Phone [id number], [address] [facility] [phone] Your Updated Medication List  
  
   
This list is accurate as of 18  8:15 AM.  Always use your most recent med list.  
  
  
  
  
 ALPRAZolam 0.5 mg tablet Commonly known as:  Margaretta Ditch Take 1 Tab by mouth nightly as needed for Anxiety. Max Daily Amount: 0.5 mg.  
  
 escitalopram oxalate 10 mg tablet Commonly known as:  Melvinia Calk TAKE 1 TABLET BY MOUTH DAILY VITAMIN D3 1,000 unit tablet Generic drug:  cholecalciferol Take  by mouth daily. Follow-up Instructions Return if symptoms worsen or fail to improve, for Follow Up. Patient Instructions For your symptoms: Your symptoms may improve with an oral antihistamine. These are available over the counter and include: 
Loratadine/claritin Cetirizine/Zyrtec Fexofenadine/Allegra Levocetirizine/Xyzal 
 
· Your symptoms may improve with a nasal steroid. These are available over the counter and include: · Flonase (aka fluticasone) · Nasocort (aka triamcinolone) · Nasonex (aka mometasone) · Rhinocort (aka budesonide) · Increase fluid intake, especially water to thin mucous and boost the immune system. · Avoid sugar and dairy while congested since they thicken mucous. · Get plenty of rest!   
· Gargle 3 times daily and as needed in Listerine or warm salt water vinegar solutions (1 tsp salt, 1 tsp vinegar in 1 cup lukewarm water.) · Use OTC nasal saline spray up each nostril four times daily. You could also consider using a netipot with distilled water. · Use humidifier at bedtime. · Use OTC Mucinex 600 mg twice daily to loosen mucous. · Use OTC Tylenol (up to 650mg every 6 hours) or Ibuprofen (up to 800 mg every 8 hours) as needed for pain, fever or headaches. Return to the doctor for evaluation: · If mucous is consistently discolored yellow or green throughout the day for more than a week · If you develop worsening facial pain · If you develop a fever that will not go away · If your symptoms worsen instead of improve Saline Nasal Washes: Care Instructions Your Care Instructions Saline nasal washes help keep the nasal passages open by washing out thick or dried mucus. This simple remedy can help relieve symptoms of allergies, sinusitis, and colds.  It also can make the nose feel more comfortable by keeping the mucous membranes moist. You may notice a little burning sensation in your nose the first few times you use the solution, but this usually gets better in a few days. Follow-up care is a key part of your treatment and safety. Be sure to make and go to all appointments, and call your doctor if you are having problems. It's also a good idea to know your test results and keep a list of the medicines you take. How can you care for yourself at home? · You can buy premixed saline solution in a squeeze bottle or other sinus rinse products at a drugstore. Read and follow the instructions on the label. · You also can make your own saline solution by adding 1 teaspoon of salt and 1 teaspoon of baking soda to 2 cups of distilled water. · If you use a homemade solution, pour a small amount into a clean bowl. Using a rubber bulb syringe, squeeze the syringe and place the tip in the salt water. Pull a small amount of the salt water into the syringe by relaxing your hand. · Sit down with your head tilted slightly back. Do not lie down. Put the tip of the bulb syringe or the squeeze bottle a little way into one of your nostrils. Gently drip or squirt a few drops into the nostril. Repeat with the other nostril. Some sneezing and gagging are normal at first. 
· Gently blow your nose. · Wipe the syringe or bottle tip clean after each use. · Repeat this 2 or 3 times a day. · Use nasal washes gently if you have nosebleeds often. When should you call for help? Watch closely for changes in your health, and be sure to contact your doctor if: 
? · You often get nosebleeds. ? · You have problems doing the nasal washes. Where can you learn more? Go to http://cornell-zainab.info/. Enter [id number] in the search box to learn more about \"Saline Nasal Washes: Care Instructions. \" Current as of: May 12, 2017 Content Version: 11.4 © 0271-5564 Cellmemore.  Care instructions adapted under license by [address] (which disclaims liability or warranty for this information). If you have questions about a medical condition or this instruction, always ask your healthcare professional. [address] any warranty or liability for your use of this information. Sinusitis: Care Instructions Your Care Instructions Sinusitis is an infection of the lining of the sinus cavities in your head. Sinusitis often follows a cold. It causes pain and pressure in your head and face. In most cases, sinusitis gets better on its own in 1 to 2 weeks. But some mild symptoms may last for several weeks. Sometimes antibiotics are needed. Follow-up care is a key part of your treatment and safety. Be sure to make and go to all appointments, and call your doctor if you are having problems. It's also a good idea to know your test results and keep a list of the medicines you take. How can you care for yourself at home? · Take an over-the-counter pain medicine, such as acetaminophen (Tylenol), ibuprofen (Advil, Motrin), or naproxen (Aleve). Read and follow all instructions on the label. · If the doctor prescribed antibiotics, take them as directed. Do not stop taking them just because you feel better. You need to take the full course of antibiotics. · Be careful when taking over-the-counter cold or flu medicines and Tylenol at the same time. Many of these medicines have acetaminophen, which is Tylenol. Read the labels to make sure that you are not taking more than the recommended dose. Too much acetaminophen (Tylenol) can be harmful. · Breathe warm, moist air from a steamy shower, a hot bath, or a sink filled with hot water. Avoid cold, dry air. Using a humidifier in your home may help. Follow the directions for cleaning the machine. · Use saline (saltwater) nasal washes to help keep your nasal passages open and wash out mucus and bacteria.  You can buy saline nose drops at a grocery store or drugstore. Or you can make your own at home by adding 1 teaspoon of salt and 1 teaspoon of baking soda to 2 cups of distilled water. If you make your own, fill a bulb syringe with the solution, insert the tip into your nostril, and squeeze gently. Richard Nokomis your nose. · Put a hot, wet towel or a warm gel pack on your face 3 or 4 times a day for 5 to 10 minutes each time. · Try a decongestant nasal spray like oxymetazoline (Afrin). Do not use it for more than 3 days in a row. Using it for more than 3 days can make your congestion worse. When should you call for help? Call your doctor now or seek immediate medical care if: 
? · You have new or worse swelling or redness in your face or around your eyes. ? · You have a new or higher fever. ? Watch closely for changes in your health, and be sure to contact your doctor if: 
? · You have new or worse facial pain. ? · The mucus from your nose becomes thicker (like pus) or has new blood in it. ? · You are not getting better as expected. Where can you learn more? Go to http://cornell-zainab.info/. Enter J519 in the search box to learn more about \"Sinusitis: Care Instructions. \" Current as of: May 12, 2017 Content Version: 11.4 © 8300-3973 Epom. Care instructions adapted under license by Net Orange (which disclaims liability or warranty for this information). If you have questions about a medical condition or this instruction, always ask your healthcare professional. [place] [address] any warranty or liability for your use of this information. Introducing [facility] & HEALTH SERVICES! Dear Josette: Thank you for requesting a Transera Communications account. Our records indicate that you already have an active Transera Communications account. You can access your account anytime at [email or web address] Did you know that you can access your hospital and ER discharge instructions at any time in Yu Rong? You can also review all of your test results from your hospital stay or ER visit. Additional Information If you have questions, please visit the Frequently Asked Questions section of the Yu Rong website at [email or web address] Remember, Yu Rong is NOT to be used for urgent needs. For medical emergencies, dial 911. Now available from your iPhone and Android! Please provide this summary of care documentation to your next provider. Your primary care clinician is listed as Lefty LERMA. If you have any questions after today's visit, please call [phone].

## 2018-02-26 NOTE — PROGRESS NOTES
[facility] Note      Subjective:     Chief Complaint   Patient presents with    Sore Throat    Cough     cough is non productive with some chest congestion    Nasal Congestion     Janie Rice is a 38y.o. year old female who presents for evaluation of the following:    Cough  Onset 3 days ago  Associated fatigue, chest burning while coughing, sinus pressure, nasal congestion Treatment: Rest, NyQuil  Endorses sick contact is daughter with similar symptoms diagnosed as a virus  Denies fever, sweats, shortness of breath, nausea, vomiting, diarrhea, rash, headache      Review of Systems   Pertinent positives and negative per HPI. All other systems  reviewed are negative for a Comprehensive ROS (10+). Past Medical History:   Diagnosis Date    SLAP tear of shoulder     left        Social History     Social History    Marital status:      Spouse name: N/A    Number of children: N/A    Years of education: N/A     Occupational History    Not on file. Social History Main Topics    Smoking status: Never Smoker    Smokeless tobacco: Never Used    Alcohol use Yes    Drug use: No    Sexual activity: Yes     Partners: Male     Birth control/ protection: Surgical     Other Topics Concern    Not on file     Social History Narrative       Current Outpatient Prescriptions   Medication Sig    escitalopram oxalate (LEXAPRO) 10 mg tablet TAKE 1 TABLET BY MOUTH DAILY    ALPRAZolam (XANAX) 0.5 mg tablet Take 1 Tab by mouth nightly as needed for Anxiety. Max Daily Amount: 0.5 mg.    cholecalciferol (VITAMIN D3) 1,000 unit tablet Take  by mouth daily. No current facility-administered medications for this visit.           Objective:     Vitals:    02/26/18 0759   BP: 113/74   Pulse: (!) 58   Resp: 18   Temp: 98.2 °F (36.8 °C)   TempSrc: Oral   SpO2: 99%   Weight: 140 lb 9.6 oz (63.8 kg)   Height: 5' 3\" (1.6 m)       Physical Examination:  General: Alert, cooperative, no distress, appears stated age. Eyes: Conjunctivae/corneas clear. PERRL, EOMs intact. Ears: Normal external ear canals both ears. TM clear and mobile bilaterally  Nose: Nares normal. Septum midline. Mucosa normal.  Maxillary sinus tenderness. Mouth/Throat: Lips, mucosa, and tongue normal. Teeth and gums normal.  No oropharyngeal erythema. No tonsillar tissue appreciated. Neck: Supple, symmetrical, trachea midline, no adenopathy. No thyroid enlargement/tenderness/nodules  Back: Symmetric, no curvature. ROM normal. No CVA tenderness. Lungs: Clear to auscultation bilaterally. Normal inspiratory and expiratory ratio. Heart: Regular rate and rhythm, S1, S2 normal, no murmur, click, rub or gallop. Abdomen: Soft, non-tender. Bowel sounds normal. No masses or organomegaly. Extremities: Extremities normal, atraumatic, no cyanosis or edema. Pulses: 2+ and symmetric all extremities. Skin: Skin color, texture, turgor normal. No rashes or lesions on exposed skin. Lymph nodes: Cervical, supraclavicular nodes normal.  Neurologic: CNII-XII intact. Strength 5/5 grossly. Sensation and reflexes normal throughout. No visits with results within 3 Month(s) from this visit. Latest known visit with results is:    Office Visit on 08/24/2017   Component Date Value Ref Range Status    Urine Culture, Routine 08/24/2017 *  Final                    Value:Beta hemolytic Streptococcus, group B  10,000-25,000 colony forming units per mL      Comment: Penicillin and ampicillin are drugs of choice for treatment of  beta-hemolytic streptococcal infections. Susceptibility testing of  penicillins and other beta-lactam agents approved by the FDA for  treatment of beta-hemolytic streptococcal infections need not be  performed routinely because nonsusceptible isolates are extremely  rare in any beta-hemolytic streptococcus and have not been reported  for Streptococcus pyogenes (group A).  (CLSI 2011)             Assessment/ Plan: Diagnoses and all orders for this visit:    1. Acute maxillary sinusitis, recurrence not specified      Trial otc meds for symptom relief discussed and listed in patient instructions- mucinex + antihistamine + sinus rinse + NSAID + humidifier  prn    If no improvement after 7 days of symptoms, by this Friday 3/2, will send in antibiotic for sinusitis. I have discussed the diagnosis with the patient and the intended plan as seen in the above orders. The patient has received an after-visit summary and questions were answered concerning future plans. I have discussed medication side effects and warnings with the patient as well. Follow-up Disposition:  Return if symptoms worsen or fail to improve, for Follow Up.       Signed,    Parris Frankel, MD  2/26/2018

## 2018-02-26 NOTE — PROGRESS NOTES
Chief Complaint   Patient presents with    Sore Throat    Cough     cough is non productive with some chest congestion    Nasal Congestion     1. Have you been to the ER, urgent care clinic since your last visit? Hospitalized since your last visit? No    2. Have you seen or consulted any other health care providers outside of the [facility] [place] since your last visit? Include any pap smears or colon screening.  No

## 2018-02-26 NOTE — PATIENT INSTRUCTIONS
For your symptoms: Your symptoms may improve with an oral antihistamine. These are available over the counter and include:  Loratadine/claritin  Cetirizine/Zyrtec  Fexofenadine/Allegra  Levocetirizine/Xyzal    · Your symptoms may improve with a nasal steroid. These are available over the counter and include:  · Flonase (aka fluticasone)  · Nasocort (aka triamcinolone)  · Nasonex (aka mometasone)  · Rhinocort (aka budesonide)    · Increase fluid intake, especially water to thin mucous and boost the immune system. · Avoid sugar and dairy while congested since they thicken mucous. · Get plenty of rest!    · Gargle 3 times daily and as needed in Listerine or warm salt water vinegar solutions (1 tsp salt, 1 tsp vinegar in 1 cup lukewarm water.)    · Use OTC nasal saline spray up each nostril four times daily. You could also consider using a netipot with distilled water. · Use humidifier at bedtime. · Use OTC Mucinex 600 mg twice daily to loosen mucous. · Use OTC Tylenol (up to 650mg every 6 hours) or Ibuprofen (up to 800 mg every 8 hours) as needed for pain, fever or headaches. Return to the doctor for evaluation:  · If mucous is consistently discolored yellow or green throughout the day for more than a week  · If you develop worsening facial pain  · If you develop a fever that will not go away  · If your symptoms worsen instead of improve               Saline Nasal Washes: Care Instructions  Your Care Instructions  Saline nasal washes help keep the nasal passages open by washing out thick or dried mucus. This simple remedy can help relieve symptoms of allergies, sinusitis, and colds. It also can make the nose feel more comfortable by keeping the mucous membranes moist. You may notice a little burning sensation in your nose the first few times you use the solution, but this usually gets better in a few days. Follow-up care is a key part of your treatment and safety.  Be sure to make and go to all appointments, and call your doctor if you are having problems. It's also a good idea to know your test results and keep a list of the medicines you take. How can you care for yourself at home? · You can buy premixed saline solution in a squeeze bottle or other sinus rinse products at a drugstore. Read and follow the instructions on the label. · You also can make your own saline solution by adding 1 teaspoon of salt and 1 teaspoon of baking soda to 2 cups of distilled water. · If you use a homemade solution, pour a small amount into a clean bowl. Using a rubber bulb syringe, squeeze the syringe and place the tip in the salt water. Pull a small amount of the salt water into the syringe by relaxing your hand. · Sit down with your head tilted slightly back. Do not lie down. Put the tip of the bulb syringe or the squeeze bottle a little way into one of your nostrils. Gently drip or squirt a few drops into the nostril. Repeat with the other nostril. Some sneezing and gagging are normal at first.  · Gently blow your nose. · Wipe the syringe or bottle tip clean after each use. · Repeat this 2 or 3 times a day. · Use nasal washes gently if you have nosebleeds often. When should you call for help? Watch closely for changes in your health, and be sure to contact your doctor if:  ? · You often get nosebleeds. ? · You have problems doing the nasal washes. Where can you learn more? Go to http://cornell-zainab.info/. Enter [id number] [id number] [phone] in the search box to learn more about \"Saline Nasal Washes: Care Instructions. \"  Current as of: May 12, 2017  Content Version: 11.4  © 5885-0145 [employer]. Care instructions adapted under license by [employer] (which disclaims liability or warranty for this information).  If you have questions about a medical condition or this instruction, always ask your healthcare professional. [address] any warranty or liability for your use of this information. Sinusitis: Care Instructions  Your Care Instructions    Sinusitis is an infection of the lining of the sinus cavities in your head. Sinusitis often follows a cold. It causes pain and pressure in your head and face. In most cases, sinusitis gets better on its own in 1 to 2 weeks. But some mild symptoms may last for several weeks. Sometimes antibiotics are needed. Follow-up care is a key part of your treatment and safety. Be sure to make and go to all appointments, and call your doctor if you are having problems. It's also a good idea to know your test results and keep a list of the medicines you take. How can you care for yourself at home? · Take an over-the-counter pain medicine, such as acetaminophen (Tylenol), ibuprofen (Advil, Motrin), or naproxen (Aleve). Read and follow all instructions on the label. · If the doctor prescribed antibiotics, take them as directed. Do not stop taking them just because you feel better. You need to take the full course of antibiotics. · Be careful when taking over-the-counter cold or flu medicines and Tylenol at the same time. Many of these medicines have acetaminophen, which is Tylenol. Read the labels to make sure that you are not taking more than the recommended dose. Too much acetaminophen (Tylenol) can be harmful. · Breathe warm, moist air from a steamy shower, a hot bath, or a sink filled with hot water. Avoid cold, dry air. Using a humidifier in your home may help. Follow the directions for cleaning the machine. · Use saline (saltwater) nasal washes to help keep your nasal passages open and wash out mucus and bacteria. You can buy saline nose drops at a grocery store or drugstore. Or you can make your own at home by adding 1 teaspoon of salt and 1 teaspoon of baking soda to 2 cups of distilled water. If you make your own, fill a bulb syringe with the solution, insert the tip into your nostril, and squeeze gently. Negin Misbah your nose.   · Put a hot, wet towel or a warm gel pack on your face 3 or 4 times a day for 5 to 10 minutes each time. · Try a decongestant nasal spray like oxymetazoline (Afrin). Do not use it for more than 3 days in a row. Using it for more than 3 days can make your congestion worse. When should you call for help? Call your doctor now or seek immediate medical care if:  ? · You have new or worse swelling or redness in your face or around your eyes. ? · You have a new or higher fever. ? Watch closely for changes in your health, and be sure to contact your doctor if:  ? · You have new or worse facial pain. ? · The mucus from your nose becomes thicker (like pus) or has new blood in it. ? · You are not getting better as expected. Where can you learn more? Go to http://cornell-zainab.info/. Enter S987 in the search box to learn more about \"Sinusitis: Care Instructions. \"  Current as of: May 12, 2017  Content Version: 11.4  © 0286-8712 [employer]. Care instructions adapted under license by [employer] (which disclaims liability or warranty for this information). If you have questions about a medical condition or this instruction, always ask your healthcare professional. [address] any warranty or liability for your use of this information.

## 2018-11-27 ENCOUNTER — OFFICE VISIT (OUTPATIENT)
Dept: FAMILY MEDICINE CLINIC | Age: 43
End: 2018-11-27

## 2018-11-27 VITALS
TEMPERATURE: 98 F | SYSTOLIC BLOOD PRESSURE: 107 MMHG | BODY MASS INDEX: 26.12 KG/M2 | OXYGEN SATURATION: 98 % | HEIGHT: 63 IN | RESPIRATION RATE: 16 BRPM | WEIGHT: 147.4 LBS | DIASTOLIC BLOOD PRESSURE: 71 MMHG | HEART RATE: 76 BPM

## 2018-11-27 DIAGNOSIS — Z23 ENCOUNTER FOR IMMUNIZATION: ICD-10-CM

## 2018-11-27 DIAGNOSIS — R35.0 URINE FREQUENCY: Primary | ICD-10-CM

## 2018-11-27 DIAGNOSIS — N89.8 VAGINAL DISCHARGE: ICD-10-CM

## 2018-11-27 LAB
BILIRUB UR QL STRIP: NEGATIVE
GLUCOSE UR-MCNC: NEGATIVE MG/DL
KETONES P FAST UR STRIP-MCNC: NEGATIVE MG/DL
PH UR STRIP: 6.5 [PH] (ref 4.6–8)
PROT UR QL STRIP: NEGATIVE
SP GR UR STRIP: 1.01 (ref 1–1.03)
UA UROBILINOGEN AMB POC: NORMAL (ref 0.2–1)
URINALYSIS CLARITY POC: CLEAR
URINALYSIS COLOR POC: YELLOW
URINE BLOOD POC: NORMAL
URINE LEUKOCYTES POC: NORMAL
URINE NITRITES POC: NEGATIVE

## 2018-11-27 RX ORDER — CIPROFLOXACIN 250 MG/1
250 TABLET, FILM COATED ORAL EVERY 12 HOURS
Qty: 14 TAB | Refills: 0 | Status: SHIPPED | OUTPATIENT
Start: 2018-11-27 | End: 2018-12-04

## 2018-11-27 RX ORDER — PHENAZOPYRIDINE HYDROCHLORIDE 95 MG/1
TABLET ORAL
COMMUNITY
End: 2019-01-18

## 2018-11-27 NOTE — PROGRESS NOTES
Chief Complaint Patient presents with  Bladder Infection  
  since Friday  Urinary Burning  Urinary Frequency 1. Have you been to the ER, urgent care clinic since your last visit? Hospitalized since your last visit? No 
 
2. Have you seen or consulted any other health care providers outside of the [address] [place] [address] since your last visit? Include any pap smears or colon screening. Patient was seen for annual well women's exam at [facility] 06/18, will obtain records.

## 2018-11-27 NOTE — PROGRESS NOTES
HISTORY OF PRESENT ILLNESS Kylah Blancas is a 41 y.o. female. HPI: Patient complaints of urinary frequency with vaginal discahrge and burning in urination X 4 day. She is sexually active . Denies abdominal or pelvic pain. BMI is 26.11, she is watching her diet, but not exercising regularly. Past Medical History:  
Diagnosis Date  SLAP tear of shoulder   
 left Past Surgical History:  
Procedure Laterality Date  HX GYN  2011  
 bladder sling  HX ORTHOPAEDIC    
 left wrist tendon [facility]  Nov 2013 L SLAP tear repair [facility]  Nov 2013 L AC joint debridement  HX TONSIL AND ADENOIDECTOMY No Known Allergies Current Outpatient Medications:  
  phenazopyridine (PYRIDIUM) 95 mg tab, Take  by mouth., Disp: , Rfl:  
  ciprofloxacin HCl (CIPRO) 250 mg tablet, Take 1 Tab by mouth every twelve (12) hours for 7 days. , Disp: 14 Tab, Rfl: 0 
  ALPRAZolam (XANAX) 0.5 mg tablet, Take 1 Tab by mouth nightly as needed for Anxiety. Max Daily Amount: 0.5 mg., Disp: 30 Tab, Rfl: 0 
  escitalopram oxalate (LEXAPRO) 10 mg tablet, TAKE 1 TABLET BY MOUTH DAILY, Disp: , Rfl: 3   cholecalciferol (VITAMIN D3) 1,000 unit tablet, Take  by mouth daily. , Disp: , Rfl:  
Review of Systems Constitutional: Negative. Respiratory: Negative. Cardiovascular: Negative. Gastrointestinal: Negative. Genitourinary: Positive for dysuria. Blood pressure 107/71, pulse 76, temperature 98 °F (36.7 °C), temperature source Oral, resp. rate 16, height 5' 3\" (1.6 m), weight 147 lb 6.4 oz (66.9 kg), SpO2 98 %. Body mass index is 26.11 kg/m². Physical Exam  
Constitutional: No distress. HENT:  
Mouth/Throat: Oropharynx is clear and moist.  
Neck: Normal range of motion. Neck supple. Cardiovascular: Normal rate and regular rhythm. No murmur heard. Pulmonary/Chest: Effort normal and breath sounds normal.  
Abdominal: Soft. Bowel sounds are normal.  
Genitourinary: Genitourinary Comments: UA is positive for blood and leukocytes Nuswab taken Nursing note and vitals reviewed. ASSESSMENT and PLAN Diagnoses and all orders for this visit: 
 
1. Urine frequency -     AMB POC URINALYSIS DIP STICK AUTO W/O MICRO 
-     ciprofloxacin HCl (CIPRO) 250 mg tablet; Take 1 Tab by mouth every twelve (12) hours for 7 days. Urine culture 2. Encounter for immunization 
-     INFLUENZA VIRUS VAC QUAD,SPLIT,PRESV FREE SYRINGE IM 
-     CO IMMUNIZ ADMIN,1 SINGLE/COMB VAC/TOXOID 3. Vaginal discharge 
-     NUSWAB VAGINITIS PLUS 4. BMI 26.0-26.9,adult Normal BMI discussed, advised to watch diet and exercise Pt was given an after visit summary which includes diagnosis, current medicines and vital and voiced understanding of treatment plan

## 2018-11-28 DIAGNOSIS — F43.23 ADJUSTMENT DISORDER WITH MIXED ANXIETY AND DEPRESSED MOOD: ICD-10-CM

## 2018-11-28 LAB — BACTERIA UR CULT: NORMAL

## 2018-11-28 RX ORDER — ALPRAZOLAM 0.5 MG/1
0.5 TABLET ORAL
Qty: 30 TAB | Refills: 0 | Status: SHIPPED | OUTPATIENT
Start: 2018-11-28

## 2018-11-29 LAB
A VAGINAE DNA VAG QL NAA+PROBE: NORMAL SCORE
BVAB2 DNA VAG QL NAA+PROBE: NORMAL SCORE
C ALBICANS DNA VAG QL NAA+PROBE: NEGATIVE
C GLABRATA DNA VAG QL NAA+PROBE: NEGATIVE
C TRACH RRNA SPEC QL NAA+PROBE: NEGATIVE
MEGA1 DNA VAG QL NAA+PROBE: NORMAL SCORE
N GONORRHOEA RRNA SPEC QL NAA+PROBE: NEGATIVE
T VAGINALIS RRNA SPEC QL NAA+PROBE: NEGATIVE

## 2018-12-08 ENCOUNTER — TELEPHONE (OUTPATIENT)
Dept: FAMILY MEDICINE CLINIC | Age: 43
End: 2018-12-08

## 2018-12-08 DIAGNOSIS — N30.00 ACUTE CYSTITIS WITHOUT HEMATURIA: Primary | ICD-10-CM

## 2018-12-08 RX ORDER — NITROFURANTOIN 25; 75 MG/1; MG/1
100 CAPSULE ORAL 2 TIMES DAILY
Qty: 10 CAP | Refills: 0 | Status: SHIPPED | OUTPATIENT
Start: 2018-12-08 | End: 2018-12-13

## 2018-12-08 NOTE — TELEPHONE ENCOUNTER
Patient reports continued UTI symptoms of dysuria and frequency. Reports no relief with Cipro. Request alternative treatment. Will send in Sangita Horan [address]. Follow up recommend if symptoms do no improve.

## 2018-12-10 NOTE — TELEPHONE ENCOUNTER
----- Message from Fredi Salguero sent at 12/8/2018 10:53 AM EST -----  Regarding: Ronak Sierra/Telephone   Pt stated she has an UTI. Pt seen Np on 11/27/18 and she has finish the meds but is still not better. Best contact number is [phone].

## 2019-01-18 ENCOUNTER — HOSPITAL ENCOUNTER (EMERGENCY)
Age: 44
Discharge: HOME | End: 2019-01-18
Attending: EMERGENCY MEDICINE
Payer: COMMERCIAL

## 2019-01-18 ENCOUNTER — APPOINTMENT (OUTPATIENT)
Dept: GENERAL RADIOLOGY | Age: 44
End: 2019-01-18
Attending: EMERGENCY MEDICINE
Payer: COMMERCIAL

## 2019-01-18 VITALS
WEIGHT: 144.29 LBS | TEMPERATURE: 98.2 F | SYSTOLIC BLOOD PRESSURE: 113 MMHG | DIASTOLIC BLOOD PRESSURE: 56 MMHG | OXYGEN SATURATION: 99 % | BODY MASS INDEX: 24.04 KG/M2 | RESPIRATION RATE: 14 BRPM | HEART RATE: 63 BPM | HEIGHT: 65 IN

## 2019-01-18 DIAGNOSIS — J06.9 VIRAL URI: Primary | ICD-10-CM

## 2019-01-18 LAB
ALBUMIN SERPL-MCNC: 3.8 G/DL (ref 3.5–5)
ALBUMIN/GLOB SERPL: 1.2 {RATIO} (ref 1.1–2.2)
ALP SERPL-CCNC: 55 U/L (ref 45–117)
ALT SERPL-CCNC: 23 U/L (ref 12–78)
AMPHET UR QL SCN: NEGATIVE
ANION GAP SERPL CALC-SCNC: 8 MMOL/L (ref 5–15)
APPEARANCE UR: ABNORMAL
AST SERPL-CCNC: 14 U/L (ref 15–37)
BACTERIA URNS QL MICRO: ABNORMAL /HPF
BARBITURATES UR QL SCN: NEGATIVE
BASOPHILS # BLD: 0 K/UL (ref 0–0.1)
BASOPHILS NFR BLD: 0 % (ref 0–1)
BENZODIAZ UR QL: NEGATIVE
BILIRUB SERPL-MCNC: 0.2 MG/DL (ref 0.2–1)
BILIRUB UR QL: NEGATIVE
BUN SERPL-MCNC: 21 MG/DL (ref 6–20)
BUN/CREAT SERPL: 20 (ref 12–20)
CALCIUM SERPL-MCNC: 8.9 MG/DL (ref 8.5–10.1)
CANNABINOIDS UR QL SCN: NEGATIVE
CHLORIDE SERPL-SCNC: 104 MMOL/L (ref 97–108)
CO2 SERPL-SCNC: 27 MMOL/L (ref 21–32)
COCAINE UR QL SCN: NEGATIVE
COLOR UR: ABNORMAL
CREAT SERPL-MCNC: 1.03 MG/DL (ref 0.55–1.02)
DEPRECATED S PYO AG THROAT QL EIA: NEGATIVE
DIFFERENTIAL METHOD BLD: ABNORMAL
DRUG SCRN COMMENT,DRGCM: NORMAL
EOSINOPHIL # BLD: 0.1 K/UL (ref 0–0.4)
EOSINOPHIL NFR BLD: 2 % (ref 0–7)
EPITH CASTS URNS QL MICRO: ABNORMAL /LPF
ERYTHROCYTE [DISTWIDTH] IN BLOOD BY AUTOMATED COUNT: 12 % (ref 11.5–14.5)
ETHANOL SERPL-MCNC: <10 MG/DL
GLOBULIN SER CALC-MCNC: 3.2 G/DL (ref 2–4)
GLUCOSE SERPL-MCNC: 105 MG/DL (ref 65–100)
GLUCOSE UR STRIP.AUTO-MCNC: NEGATIVE MG/DL
HCT VFR BLD AUTO: 37.3 % (ref 35–47)
HGB BLD-MCNC: 12.7 G/DL (ref 11.5–16)
HGB UR QL STRIP: ABNORMAL
IMM GRANULOCYTES # BLD AUTO: 0 K/UL (ref 0–0.04)
IMM GRANULOCYTES NFR BLD AUTO: 0 % (ref 0–0.5)
KETONES UR QL STRIP.AUTO: NEGATIVE MG/DL
LEUKOCYTE ESTERASE UR QL STRIP.AUTO: ABNORMAL
LYMPHOCYTES # BLD: 2.4 K/UL (ref 0.8–3.5)
LYMPHOCYTES NFR BLD: 29 % (ref 12–49)
MCH RBC QN AUTO: 30.3 PG (ref 26–34)
MCHC RBC AUTO-ENTMCNC: 34 G/DL (ref 30–36.5)
MCV RBC AUTO: 89 FL (ref 80–99)
METHADONE UR QL: NEGATIVE
MONOCYTES # BLD: 0.7 K/UL (ref 0–1)
MONOCYTES NFR BLD: 9 % (ref 5–13)
MUCOUS THREADS URNS QL MICRO: ABNORMAL /LPF
NEUTS SEG # BLD: 4.8 K/UL (ref 1.8–8)
NEUTS SEG NFR BLD: 60 % (ref 32–75)
NITRITE UR QL STRIP.AUTO: NEGATIVE
NRBC # BLD: 0 K/UL (ref 0–0.01)
NRBC BLD-RTO: 0 PER 100 WBC
OPIATES UR QL: NEGATIVE
PCP UR QL: NEGATIVE
PH UR STRIP: 6 [PH] (ref 5–8)
PLATELET # BLD AUTO: 275 K/UL (ref 150–400)
PMV BLD AUTO: 8.4 FL (ref 8.9–12.9)
POTASSIUM SERPL-SCNC: 3.8 MMOL/L (ref 3.5–5.1)
PROT SERPL-MCNC: 7 G/DL (ref 6.4–8.2)
PROT UR STRIP-MCNC: NEGATIVE MG/DL
RBC # BLD AUTO: 4.19 M/UL (ref 3.8–5.2)
RBC #/AREA URNS HPF: ABNORMAL /HPF (ref 0–5)
SODIUM SERPL-SCNC: 139 MMOL/L (ref 136–145)
SP GR UR REFRACTOMETRY: 1.02 (ref 1–1.03)
UR CULT HOLD, URHOLD: NORMAL
UROBILINOGEN UR QL STRIP.AUTO: 0.2 EU/DL (ref 0.2–1)
WBC # BLD AUTO: 8.1 K/UL (ref 3.6–11)
WBC URNS QL MICRO: ABNORMAL /HPF (ref 0–4)

## 2019-01-18 PROCEDURE — 80307 DRUG TEST PRSMV CHEM ANLYZR: CPT

## 2019-01-18 PROCEDURE — 80053 COMPREHEN METABOLIC PANEL: CPT

## 2019-01-18 PROCEDURE — 96374 THER/PROPH/DIAG INJ IV PUSH: CPT

## 2019-01-18 PROCEDURE — 71046 X-RAY EXAM CHEST 2 VIEWS: CPT

## 2019-01-18 PROCEDURE — 36415 COLL VENOUS BLD VENIPUNCTURE: CPT

## 2019-01-18 PROCEDURE — 81001 URINALYSIS AUTO W/SCOPE: CPT

## 2019-01-18 PROCEDURE — 87880 STREP A ASSAY W/OPTIC: CPT

## 2019-01-18 PROCEDURE — 74011250636 HC RX REV CODE- 250/636: Performed by: EMERGENCY MEDICINE

## 2019-01-18 PROCEDURE — 85025 COMPLETE CBC W/AUTO DIFF WBC: CPT

## 2019-01-18 PROCEDURE — 99283 EMERGENCY DEPT VISIT LOW MDM: CPT

## 2019-01-18 PROCEDURE — 87070 CULTURE OTHR SPECIMN AEROBIC: CPT

## 2019-01-18 RX ORDER — KETOROLAC TROMETHAMINE 30 MG/ML
15 INJECTION, SOLUTION INTRAMUSCULAR; INTRAVENOUS
Status: COMPLETED | OUTPATIENT
Start: 2019-01-18 | End: 2019-01-18

## 2019-01-18 RX ADMIN — KETOROLAC TROMETHAMINE 15 MG: 30 INJECTION, SOLUTION INTRAMUSCULAR at 22:00

## 2019-01-19 NOTE — ED PROVIDER NOTES
The history is provided by the patient and a relative. Headache This is a new problem. The current episode started 12 to 24 hours ago. The problem occurs constantly. The problem has not changed since onset. The headache is aggravated by nothing. The pain is located in the generalized region. The quality of the pain is described as dull. The pain is at a severity of 3/10. The pain is mild. Associated symptoms include malaise/fatigue. Pertinent negatives include no anorexia, no fever, no chest pressure, no near-syncope, no orthopnea, no palpitations, no syncope, no shortness of breath, no weakness, no tingling, no dizziness, no visual change, no nausea and no vomiting. She has tried nothing for the symptoms. The treatment provided no relief. Past Medical History:  
Diagnosis Date  SLAP tear of shoulder   
 left Past Surgical History:  
Procedure Laterality Date  HX GYN  2011  
 bladder sling  HX ORTHOPAEDIC    
 left wrist tendon [facility] ORTHOPAEDIC  Nov 2013 L SLAP tear repair [facility] ORTHOPAEDIC  Nov 2013 L AC joint debridement  HX TONSIL AND ADENOIDECTOMY Family History:  
Problem Relation Age of Onset  Heart Disease Father CHF  Heart Disease Maternal Grandfather  Stroke Paternal Grandfather Social History Socioeconomic History  Marital status:  Spouse name: Not on file  Number of children: Not on file  Years of education: Not on file  Highest education level: Not on file Social Needs  Financial resource strain: Not on file  Food insecurity - worry: Not on file  Food insecurity - inability: Not on file  Transportation needs - medical: Not on file  Transportation needs - non-medical: Not on file Occupational History  Not on file Tobacco Use  Smoking status: Never Smoker  Smokeless tobacco: Never Used Substance and Sexual Activity  Alcohol use: Yes  Drug use:  No  
  Sexual activity: Yes  
  Partners: Male Birth control/protection: Surgical  
Other Topics Concern  Not on file Social History Narrative  Not on file ALLERGIES: Patient has no known allergies. Review of Systems Constitutional: Positive for malaise/fatigue. Negative for activity change, chills and fever. HENT: Positive for sore throat. Negative for nosebleeds, trouble swallowing and voice change. Eyes: Negative for visual disturbance. Respiratory: Negative for shortness of breath. Cardiovascular: Negative for chest pain, palpitations, orthopnea, syncope and near-syncope. Gastrointestinal: Negative for abdominal pain, anorexia, constipation, diarrhea, nausea and vomiting. Genitourinary: Negative for difficulty urinating, dysuria, hematuria and urgency. Musculoskeletal: Positive for neck pain. Negative for back pain and neck stiffness. Skin: Negative for color change. Allergic/Immunologic: Negative for immunocompromised state. Neurological: Positive for headaches. Negative for dizziness, tingling, seizures, syncope, weakness, light-headedness and numbness. Psychiatric/Behavioral: Negative for behavioral problems, confusion, hallucinations, self-injury and suicidal ideas. Vitals:  
 01/18/19 2126 BP: 127/71 Pulse: 70 Resp: 16 Temp: 98 °F (36.7 °C) SpO2: 99% Weight: 65.5 kg (144 lb 4.7 oz) Height: 5' 4.96\" (1.65 m) Physical Exam  
Constitutional: She is oriented to person, place, and time. She appears well-developed and well-nourished. No distress. HENT:  
Head: Normocephalic and atraumatic. Eyes: Pupils are equal, round, and reactive to light. Neck: Normal range of motion. Neck supple. Cardiovascular: Normal rate, regular rhythm and normal heart sounds. Exam reveals no gallop and no friction rub. No murmur heard. Pulmonary/Chest: Effort normal and breath sounds normal. No respiratory distress. She has no wheezes. Abdominal: Soft. Bowel sounds are normal. She exhibits no distension. There is no tenderness. There is no rebound and no guarding. Musculoskeletal: Normal range of motion. Neurological: She is alert and oriented to person, place, and time. Skin: Skin is warm. No rash noted. She is not diaphoretic. Psychiatric: She has a normal mood and affect. Her behavior is normal. Judgment and thought content normal.  
Nursing note and vitals reviewed. MDM This is a 79-year-old female with past medical history, review of systems, physical exam as above, presenting with complaints of onset of neck pain, throat, headache, approximately 12 hours prior to arrival. She denies fevers, chills, chest pain, shortness of breath, nausea or vomiting. She denies recent travel. She endorses sick contacts within her household, denies fevers, photophobia. Physical exam is remarkable for well-appearing middle-age female, in no acute distress, with nonfocal neurologic exam, clear breath sounds, unremarkable posterior pharynx, regular rate and rhythm without murmurs gallops rubs, soft nontender abdomen, bilateral paraspinal cervical tenderness to palpation, without midline tenderness, negative for lymphadenopathy. Suspect viral URI, with sore throat, headache, myalgias. Patient states she is not taking anything for discomfort. Plan to offer pain control, obtain CMP, CBC, UA, UDS, blood alcohol, rapid strep and chest x-ray. We will reassess, and make a disposition based the patient's diagnostics and response to therapy. Procedures 10:59 PM 
Unremarkable lab work and imaging, patient states symptoms improving,  likely viral URI. Will DC home with conservative therapy, PCP f/u and return precautions.

## 2019-01-19 NOTE — ED TRIAGE NOTES
Pt c/o neck pain, sore throat, headache, ear pain. Started this morning. No fever that she is aware of.

## 2019-01-19 NOTE — ED NOTES
Provided with d/c instructions, pt verbalized understanding. Pt having improved pain, decreased tingling, no other changes in status.

## 2019-01-19 NOTE — DISCHARGE INSTRUCTIONS
Patient Education        Upper Respiratory Infection (Cold): Care Instructions  Your Care Instructions    An upper respiratory infection, or URI, is an infection of the nose, sinuses, or throat. URIs are spread by coughs, sneezes, and direct contact. The common cold is the most frequent kind of URI. The flu and sinus infections are other kinds of URIs. Almost all URIs are caused by viruses. Antibiotics won't cure them. But you can treat most infections with home care. This may include drinking lots of fluids and taking over-the-counter pain medicine. You will probably feel better in 4 to 10 days. The doctor has checked you carefully, but problems can develop later. If you notice any problems or new symptoms, get medical treatment right away. Follow-up care is a key part of your treatment and safety. Be sure to make and go to all appointments, and call your doctor if you are having problems. It's also a good idea to know your test results and keep a list of the medicines you take. How can you care for yourself at home? · To prevent dehydration, drink plenty of fluids, enough so that your urine is light yellow or clear like water. Choose water and other caffeine-free clear liquids until you feel better. If you have kidney, heart, or liver disease and have to limit fluids, talk with your doctor before you increase the amount of fluids you drink. · Take an over-the-counter pain medicine, such as acetaminophen (Tylenol), ibuprofen (Advil, Motrin), or naproxen (Aleve). Read and follow all instructions on the label. · Before you use cough and cold medicines, check the label. These medicines may not be safe for young children or for people with certain health problems. · Be careful when taking over-the-counter cold or flu medicines and Tylenol at the same time. Many of these medicines have acetaminophen, which is Tylenol. Read the labels to make sure that you are not taking more than the recommended dose.  Too much acetaminophen (Tylenol) can be harmful. · Get plenty of rest.  · Do not smoke or allow others to smoke around you. If you need help quitting, talk to your doctor about stop-smoking programs and medicines. These can increase your chances of quitting for good. When should you call for help? Call 911 anytime you think you may need emergency care. For example, call if:    · You have severe trouble breathing.    Call your doctor now or seek immediate medical care if:    · You seem to be getting much sicker.     · You have new or worse trouble breathing.     · You have a new or higher fever.     · You have a new rash.    Watch closely for changes in your health, and be sure to contact your doctor if:    · You have a new symptom, such as a sore throat, an earache, or sinus pain.     · You cough more deeply or more often, especially if you notice more mucus or a change in the color of your mucus.     · You do not get better as expected. Where can you learn more? Go to http://cornell-zainab.info/. Enter Z107 in the search box to learn more about \"Upper Respiratory Infection (Cold): Care Instructions. \"  Current as of: September 5, 2018  Content Version: 11.9  © 4597-5609 [employer]. Care instructions adapted under license by [employer] (which disclaims liability or warranty for this information). If you have questions about a medical condition or this instruction, always ask your healthcare professional. [place] [address] any warranty or liability for your use of this information.

## 2019-01-21 LAB
BACTERIA SPEC CULT: NORMAL
SERVICE CMNT-IMP: NORMAL

## 2019-03-18 ENCOUNTER — OFFICE VISIT (OUTPATIENT)
Dept: FAMILY MEDICINE CLINIC | Age: 44
End: 2019-03-18

## 2019-03-18 VITALS
HEIGHT: 65 IN | BODY MASS INDEX: 24.83 KG/M2 | OXYGEN SATURATION: 97 % | HEART RATE: 58 BPM | TEMPERATURE: 98.2 F | SYSTOLIC BLOOD PRESSURE: 105 MMHG | RESPIRATION RATE: 16 BRPM | DIASTOLIC BLOOD PRESSURE: 49 MMHG | WEIGHT: 149 LBS

## 2019-03-18 DIAGNOSIS — R52 BODY ACHES: ICD-10-CM

## 2019-03-18 DIAGNOSIS — J11.1 INFLUENZA: Primary | ICD-10-CM

## 2019-03-18 LAB
QUICKVUE INFLUENZA TEST: POSITIVE
VALID INTERNAL CONTROL?: YES

## 2019-03-18 RX ORDER — IBUPROFEN 200 MG
TABLET ORAL
COMMUNITY
End: 2019-12-23

## 2019-03-18 NOTE — PATIENT INSTRUCTIONS
Influenza (Flu): Care Instructions  Your Care Instructions    Influenza (flu) is an infection in the lungs and breathing passages. It is caused by the influenza virus. There are different strains, or types, of the flu virus from year to year. Unlike the common cold, the flu comes on suddenly and the symptoms, such as a cough, congestion, fever, chills, fatigue, aches, and pains, are more severe. These symptoms may last up to 10 days. Although the flu can make you feel very sick, it usually doesn't cause serious health problems. Home treatment is usually all you need for flu symptoms. But your doctor may prescribe antiviral medicine to prevent other health problems, such as pneumonia, from developing. Older people and those who have a long-term health condition, such as lung disease, are most at risk for having pneumonia or other health problems. Follow-up care is a key part of your treatment and safety. Be sure to make and go to all appointments, and call your doctor if you are having problems. It's also a good idea to know your test results and keep a list of the medicines you take. How can you care for yourself at home? · Get plenty of rest.  · Drink plenty of fluids, enough so that your urine is light yellow or clear like water. If you have kidney, heart, or liver disease and have to limit fluids, talk with your doctor before you increase the amount of fluids you drink. · Take an over-the-counter pain medicine if needed, such as acetaminophen (Tylenol), ibuprofen (Advil, Motrin), or naproxen (Aleve), to relieve fever, headache, and muscle aches. Read and follow all instructions on the label. No one younger than 20 should take aspirin. It has been linked to Reye syndrome, a serious illness. · Do not smoke. Smoking can make the flu worse. If you need help quitting, talk to your doctor about stop-smoking programs and medicines. These can increase your chances of quitting for good.   · Breathe moist air from a hot shower or from a sink filled with hot water to help clear a stuffy nose. · Before you use cough and cold medicines, check the label. These medicines may not be safe for young children or for people with certain health problems. · If the skin around your nose and lips becomes sore, put some petroleum jelly on the area. · To ease coughing:  ? Drink fluids to soothe a scratchy throat. ? Suck on cough drops or plain hard candy. ? Take an over-the-counter cough medicine that contains dextromethorphan to help you get some sleep. Read and follow all instructions on the label. ? Raise your head at night with an extra pillow. This may help you rest if coughing keeps you awake. · Take any prescribed medicine exactly as directed. Call your doctor if you think you are having a problem with your medicine. To avoid spreading the flu  · Wash your hands regularly, and keep your hands away from your face. · Stay home from school, work, and other public places until you are feeling better and your fever has been gone for at least 24 hours. The fever needs to have gone away on its own without the help of medicine. · Ask people living with you to talk to their doctors about preventing the flu. They may get antiviral medicine to keep from getting the flu from you. · To prevent the flu in the future, get a flu vaccine every fall. Encourage people living with you to get the vaccine. · Cover your mouth when you cough or sneeze. When should you call for help? Call 911 anytime you think you may need emergency care.  For example, call if:    · You have severe trouble breathing.    Call your doctor now or seek immediate medical care if:    · You have new or worse trouble breathing.     · You seem to be getting much sicker.     · You feel very sleepy or confused.     · You have a new or higher fever.     · You get a new rash.    Watch closely for changes in your health, and be sure to contact your doctor if:    · You begin to get better and then get worse.     · You are not getting better after 1 week. Where can you learn more? Go to http://cornell-zainab.info/. Enter Y345 in the search box to learn more about \"Influenza (Flu): Care Instructions. \"  Current as of: September 5, 2018  Content Version: 11.9  © 3207-0211 [employer]. Care instructions adapted under license by [employer] (which disclaims liability or warranty for this information). If you have questions about a medical condition or this instruction, always ask your healthcare professional. [place] [address] any warranty or liability for your use of this information.

## 2019-03-18 NOTE — PROGRESS NOTES
Chief Complaint   Patient presents with    Cold Symptoms     fever, body aches, chest congestion x 2-3 days     1. Have you been to the ER, urgent care clinic since your last visit? Hospitalized since your last visit? No    2. Have you seen or consulted any other health care providers outside of the [address] [place] [address] since your last visit? Include any pap smears or colon screening.  No

## 2019-03-20 ENCOUNTER — TELEPHONE (OUTPATIENT)
Dept: FAMILY MEDICINE CLINIC | Age: 44
End: 2019-03-20

## 2019-03-20 NOTE — TELEPHONE ENCOUNTER
----- Message from Tiffany Kramer sent at 3/20/2019 12:11 PM EDT -----  Regarding: Np Holsinger/ Telephone  Pt requesting a call back because she was seen on Monday March 18,2019 and her fever still will not go down. Pt best contact number is [phone].

## 2019-03-20 NOTE — TELEPHONE ENCOUNTER
Outbound call to patient. Patient states that she is still running a fever of 103. F. Patient states that she is unable to keep the fever down. Patient states that head pain and ear pain has increased over time. Patient has been switching between ibuprofen and tylenol and not giving much relief. Patient is unable to take Tamiflu. Patient states that she is a little worried because she is in a lot of pain. Advised patient that she should go to ER or Urgent Care because pain has gotten worse and fever has not went away despite taking Tylenol and ibuprofen. Patient verbalized understanding.

## 2019-08-15 NOTE — PROGRESS NOTES
Assessment/Plan:     Diagnoses and all orders for this visit:    1. Influenza  She declines Tamiflu which causes nausea. She will continue symptomatic treatment. She has passed the window of effectiveness for Tamiflu at this time. She will be out of work until fever resolves. We will see her back as needed. 2. Body aches  -     AMB POC RAPID INFLUENZA TEST        Follow-up Disposition:  Return if symptoms worsen or fail to improve. Discussed expected course/resolution/complications of diagnosis in detail with patient.    Medication risks/benefits/costs/interactions/alternatives discussed with patient.    Pt was given after visit summary which includes diagnoses, current medications & vitals. Pt expressed understanding with the diagnosis and plan          Subjective:      Layla Montalvo is a 41 y.o. female who presents for had concerns including Cold Symptoms (fever, body aches, chest congestion x 2-3 days). Upper Respiratory Infection  Patient complains of symptoms of a URI. Symptoms include congestion, fever and cough. Onset of symptoms was 3 days ago, unchanged since that time. She also c/o achiness and low grade fever for the past 3 days . She is drinking plenty of fluids. . Evaluation to date: none. Treatment to date: OTC products, which have been somewhat effective. Current Outpatient Medications   Medication Sig Dispense Refill    levonorgestrel (MIRENA) 20 mcg/24 hours (5 yrs) 52 mg IUD Mirena 20 mcg/24 hours (5 yrs) 52 mg intrauterine device   Take by intrauterine route.  ibuprofen (MOTRIN) 200 mg tablet Take  by mouth.  ALPRAZolam (XANAX) 0.5 mg tablet Take 1 Tab by mouth nightly as needed for Anxiety. Max Daily Amount: 0.5 mg. 30 Tab 0    cholecalciferol (VITAMIN D3) 1,000 unit tablet Take  by mouth daily. No Known Allergies    ROS:   Review of Systems   Constitutional: Positive for fever and malaise/fatigue. Negative for chills.    HENT: Negative for CPM including low cholesterol diet, weight reduction and exercise pending labs. Target LDL <130, triglycerides <150 and HDL >40. Further evaluation, treatment, and follow-up per orders, current med list, and patient instructions.     congestion, ear pain, sinus pain and sore throat. Respiratory: Positive for cough. Negative for sputum production, shortness of breath and wheezing. Cardiovascular: Negative for chest pain. Neurological: Negative for seizures. Endo/Heme/Allergies: Negative for environmental allergies. Objective:     Visit Vitals  /49   Pulse (!) 58   Temp 98.2 °F (36.8 °C) (Oral)   Resp 16   Ht 5' 4.96\" (1.65 m)   Wt 149 lb (67.6 kg)   LMP 03/11/2019 (Approximate)   SpO2 97%   BMI 24.83 kg/m²       Vitals and Nurse Documentation reviewed. Physical Exam   Constitutional: She has a sickly appearance. No distress. HENT:   Right Ear: Tympanic membrane is not erythematous and not bulging. No middle ear effusion. Left Ear: Tympanic membrane is not erythematous and not bulging. No middle ear effusion. Nose: No rhinorrhea. Right sinus exhibits no maxillary sinus tenderness and no frontal sinus tenderness. Left sinus exhibits no maxillary sinus tenderness and no frontal sinus tenderness. Mouth/Throat: No oropharyngeal exudate or posterior oropharyngeal erythema. Eyes: EOM and lids are normal.   Cardiovascular: S1 normal and S2 normal. Exam reveals no gallop and no friction rub. No murmur heard. Pulmonary/Chest: Breath sounds normal. She has no wheezes. Lymphadenopathy:     She has no cervical adenopathy. Skin: Skin is warm and dry.    Psychiatric: Mood and affect normal.

## 2019-12-23 ENCOUNTER — OFFICE VISIT (OUTPATIENT)
Dept: SURGERY | Age: 44
End: 2019-12-23

## 2019-12-23 ENCOUNTER — DOCUMENTATION ONLY (OUTPATIENT)
Dept: SURGERY | Age: 44
End: 2019-12-23

## 2019-12-23 VITALS
BODY MASS INDEX: 24.8 KG/M2 | WEIGHT: 140 LBS | HEIGHT: 63 IN | DIASTOLIC BLOOD PRESSURE: 81 MMHG | HEART RATE: 58 BPM | SYSTOLIC BLOOD PRESSURE: 115 MMHG

## 2019-12-23 DIAGNOSIS — N64.59 BREAST THICKENING: ICD-10-CM

## 2019-12-23 DIAGNOSIS — R92.8 ABNORMAL MAMMOGRAM OF LEFT BREAST: Primary | ICD-10-CM

## 2019-12-23 NOTE — PROGRESS NOTES
HISTORY OF PRESENT ILLNESS Bernard Christiansen is a 45 y.o. female. HPI    NEW patient presents for consultation at the request of Socrates Soares NP for abnormal LEFT mammogram.  She had short term follow-up of her left breast last week, and a biopsy was recommended, but cannot be scheduled until late February. Says that she has had this area biopsied twice, once in 2017 and once prior to that date (one a stereotactic biopsy and one an U/S biopsy). Pathology was benign both times; she remembers the [address] being used for one of the diagnoses. She is not feeling any breast pain, has no nipple discharge/retraction or skin changes. Does have an \"awareness\" of something in that breast.   
FH - Maternal aunt had breast cancer at age 53-48. Recent imaging has been at [address] [place], BIRADS 4, suspicious. Review of Systems Constitutional: Negative. HENT: Negative. Eyes: Negative. Respiratory: Negative. Cardiovascular: Negative. Gastrointestinal: Negative. Genitourinary: Negative. Musculoskeletal: Negative. Skin: Negative. Neurological: Negative. Endo/Heme/Allergies: Negative. Psychiatric/Behavioral: Negative. Physical Exam 
 
ASSESSMENT and PLAN 
{ASSESSMENT/PLAN:69813}

## 2019-12-23 NOTE — LETTER
12/27/2019 4:01 PM 
 
Patient:  Corie Miranda YOB: 1975 Date of Visit: 12/23/2019 Dear Cat: Thank you for referring Ms. Herrera Comment to me for evaluation/treatment. Below are the relevant portions of my assessment and plan of care. HISTORY OF PRESENT ILLNESS Corie Miranda is a 45 y.o. female. HPI 
NEW patient presents for consultation at the request of Cathryn Matos NP for abnormal LEFT mammogram.  She had short term follow-up of her left breast last week, and a biopsy was recommended, but cannot be scheduled until late February. Says that she has had this area biopsied twice, once in 2017 and once prior to that date (one a stereotactic biopsy and one an U/S biopsy). Pathology was benign both times; she remembers the word [address] [place] [address] [address] being used for one of the diagnoses. She is not feeling any breast pain, has no nipple discharge/retraction or skin changes. Does have an \"awareness\" of something in that breast.   
 
FH - Maternal aunt had breast cancer at age 53-48. Recent imaging has been at [facility], BIRADS 4, suspicious.  
  
  
Past Medical History:  
Diagnosis Date  SLAP tear of shoulder   
 left Past Surgical History:  
Procedure Laterality Date  HX GYN  2011  
 bladder sling  HX ORTHOPAEDIC    
 left wrist tendon [facility] ORTHOPAEDIC  Nov 2013 L SLAP tear repair [facility] ORTHOPAEDIC  Nov 2013 L AC joint debridement  HX TONSIL AND ADENOIDECTOMY Social History Socioeconomic History  Marital status:  Spouse name: Not on file  Number of children: Not on file  Years of education: Not on file  Highest education level: Not on file Occupational History  Not on file Social Needs  Financial resource strain: Not on file  Food insecurity:  
  Worry: Not on file Inability: Not on file  Transportation needs:  
  Medical: Not on file Non-medical: Not on file Tobacco Use  
  Smoking status: Never Smoker  Smokeless tobacco: Never Used Substance and Sexual Activity  Alcohol use: Yes  Drug use: No  
 Sexual activity: Yes  
  Partners: Male Birth control/protection: Surgical  
Lifestyle  Physical activity:  
  Days per week: Not on file Minutes per session: Not on file  Stress: Not on file Relationships  Social connections:  
  Talks on phone: Not on file Gets together: Not on file Attends Yarsani service: Not on file Active member of club or organization: Not on file Attends meetings of clubs or organizations: Not on file Relationship status: Not on file  Intimate partner violence:  
  Fear of current or ex partner: Not on file Emotionally abused: Not on file Physically abused: Not on file Forced sexual activity: Not on file Other Topics Concern  Not on file Social History Narrative  Not on file Current Outpatient Medications on File Prior to Visit Medication Sig Dispense Refill  levonorgestrel (MIRENA) 20 mcg/24 hours (5 yrs) 52 mg IUD Mirena 20 mcg/24 hours (5 yrs) 52 mg intrauterine device Take by intrauterine route.  [DISCONTINUED] ibuprofen (MOTRIN) 200 mg tablet Take  by mouth.  ALPRAZolam (XANAX) 0.5 mg tablet Take 1 Tab by mouth nightly as needed for Anxiety. Max Daily Amount: 0.5 mg. 30 Tab 0  cholecalciferol (VITAMIN D3) 1,000 unit tablet Take  by mouth daily. No current facility-administered medications on file prior to visit. No Known Allergies OB History No obstetric history on file. Obstetric Comments Menarche 17, LMP 12/17/19, # of children 2, age of 1st delivery 31, Hysterectomy/oophorectomy No/No Breast bx Yes, two on the LEFT breast , history of breast feeding Yes, BCP Yes, in the past, currently has IUD, Hormone therapy No 
  
  
  
 
 
ROS Constitutional: Negative. HENT: Negative. Eyes: Negative. Respiratory: Negative. Cardiovascular: Negative. Gastrointestinal: Negative. Genitourinary: Negative. Musculoskeletal: Negative. Skin: Negative. Neurological: Negative. Endo/Heme/Allergies: Negative. Psychiatric/Behavioral: Negative. Physical Exam 
Exam conducted with a chaperone present. Cardiovascular:  
   Rate and Rhythm: Normal rate and regular rhythm. Heart sounds: Normal heart sounds. Pulmonary:  
   Breath sounds: Normal breath sounds. Chest:  
   Breasts: Breasts are symmetrical.  
      Right: Normal. No swelling, bleeding, inverted nipple, mass, nipple discharge, skin change or tenderness. Left: Normal. No swelling, bleeding, inverted nipple, mass, nipple discharge, skin change or tenderness. Lymphadenopathy:  
   Cervical:  
   Right cervical: No superficial, deep or posterior cervical adenopathy. Left cervical: No superficial, deep or posterior cervical adenopathy. Upper Body:  
   Right upper body: No supraclavicular or axillary adenopathy. Left upper body: No supraclavicular or axillary adenopathy. BREAST ULTRASOUND Indication: LEFT breast thickening 2:00 Technique: The area was scanned using a high-frequency linear-array near-field transducer Findings: No abnormal mass, lesion, or shadowing noted; no cysts; no axillary lymphadenopathy Impression: Normal dense breast tissue Disposition: Will review films with radiology ASSESSMENT and PLAN 
  ICD-10-CM ICD-9-CM 1. Abnormal mammogram of left breast R92.8 793.80 2. Breast thickening N64.59 611.79 New patient presents for abnormal LEFT breast mammogram, and is doing well overall. Thickening on exam at LEFT breast 2:00, with normal dense breast tissue on US. Will review films with radiology and call with recommendation. . This plan was reviewed with the patient and patient agrees. All questions were answered.  
 
Written by Elis Archer, as dictated by Dr. Elsa Reyes Nat Reid MD. 
 
If you have questions, please do not hesitate to call me. I look forward to following Ms. Van along with you.  
 
 
 
Sincerely, 
 
 
Ashley Saleh MD

## 2019-12-23 NOTE — PROGRESS NOTES
HISTORY OF PRESENT ILLNESS  Willie Burns is a 45 y.o. female. HPI  NEW patient presents for consultation at the request of Rodger Hinojosa NP for abnormal LEFT mammogram.  She had short term follow-up of her left breast last week, and a biopsy was recommended, but cannot be scheduled until late February. Says that she has had this area biopsied twice, once in 2017 and once prior to that date (one a stereotactic biopsy and one an U/S biopsy). Pathology was benign both times; she remembers the [employer] [address] being used for one of the diagnoses. She is not feeling any breast pain, has no nipple discharge/retraction or skin changes. Does have an \"awareness\" of something in that breast.      FH - Maternal aunt had breast cancer at age 53-48. Recent imaging has been at [address] [place], BIRADS 4, suspicious.         Past Medical History:   Diagnosis Date    SLAP tear of shoulder     left       Past Surgical History:   Procedure Laterality Date    HX GYN  2011    bladder sling    HX ORTHOPAEDIC      left wrist tendon    HX ORTHOPAEDIC  Nov 2013    L SLAP tear repair    HX ORTHOPAEDIC  Nov 2013    L AC joint debridement    HX TONSIL AND ADENOIDECTOMY         Social History     Socioeconomic History    Marital status:      Spouse name: Not on file    Number of children: Not on file    Years of education: Not on file    Highest education level: Not on file   Occupational History    Not on file   Social Needs    Financial resource strain: Not on file    Food insecurity:     Worry: Not on file     Inability: Not on file    Transportation needs:     Medical: Not on file     Non-medical: Not on file   Tobacco Use    Smoking status: Never Smoker    Smokeless tobacco: Never Used   Substance and Sexual Activity    Alcohol use:  Yes    Drug use: No    Sexual activity: Yes     Partners: Male     Birth control/protection: Surgical   Lifestyle    Physical activity:     Days per week: Not on file     Minutes per session: Not on file    Stress: Not on file   Relationships    Social connections:     Talks on phone: Not on file     Gets together: Not on file     Attends Christian service: Not on file     Active member of club or organization: Not on file     Attends meetings of clubs or organizations: Not on file     Relationship status: Not on file    Intimate partner violence:     Fear of current or ex partner: Not on file     Emotionally abused: Not on file     Physically abused: Not on file     Forced sexual activity: Not on file   Other Topics Concern    Not on file   Social History Narrative    Not on file       Current Outpatient Medications on File Prior to Visit   Medication Sig Dispense Refill    levonorgestrel (MIRENA) 20 mcg/24 hours (5 yrs) 52 mg IUD Mirena 20 mcg/24 hours (5 yrs) 52 mg intrauterine device   Take by intrauterine route.  [DISCONTINUED] ibuprofen (MOTRIN) 200 mg tablet Take  by mouth.  ALPRAZolam (XANAX) 0.5 mg tablet Take 1 Tab by mouth nightly as needed for Anxiety. Max Daily Amount: 0.5 mg. 30 Tab 0    cholecalciferol (VITAMIN D3) 1,000 unit tablet Take  by mouth daily. No current facility-administered medications on file prior to visit. No Known Allergies    OB History    No obstetric history on file. Obstetric Comments   Menarche 17, LMP 12/17/19, # of children 2, age of 4st delivery 31, Hysterectomy/oophorectomy No/No Breast bx Yes, two on the LEFT breast , history of breast feeding Yes, BCP Yes, in the past, currently has IUD, Hormone therapy No               ROS  Constitutional: Negative. HENT: Negative. Eyes: Negative. Respiratory: Negative. Cardiovascular: Negative. Gastrointestinal: Negative. Genitourinary: Negative. Musculoskeletal: Negative. Skin: Negative. Neurological: Negative. Endo/Heme/Allergies: Negative. Psychiatric/Behavioral: Negative.        Physical Exam  Exam conducted with a chaperone present. Cardiovascular:      Rate and Rhythm: Normal rate and regular rhythm. Heart sounds: Normal heart sounds. Pulmonary:      Breath sounds: Normal breath sounds. Chest:      Breasts: Breasts are symmetrical.         Right: Normal. No swelling, bleeding, inverted nipple, mass, nipple discharge, skin change or tenderness. Left: Normal. No swelling, bleeding, inverted nipple, mass, nipple discharge, skin change or tenderness. Lymphadenopathy:      Cervical:      Right cervical: No superficial, deep or posterior cervical adenopathy. Left cervical: No superficial, deep or posterior cervical adenopathy. Upper Body:      Right upper body: No supraclavicular or axillary adenopathy. Left upper body: No supraclavicular or axillary adenopathy. BREAST ULTRASOUND  Indication: LEFT breast thickening 2:00  Technique: The area was scanned using a high-frequency linear-array near-field transducer  Findings: No abnormal mass, lesion, or shadowing noted; no cysts; no axillary lymphadenopathy  Impression: Normal dense breast tissue  Disposition: Will review films with radiology    ASSESSMENT and PLAN    ICD-10-CM ICD-9-CM    1. Abnormal mammogram of left breast R92.8 793.80    2. Breast thickening N64.59 611.79       New patient presents for abnormal LEFT breast mammogram, and is doing well overall. Thickening on exam at LEFT breast 2:00, with normal dense breast tissue on US. Will review films with radiology and call with recommendation. . This plan was reviewed with the patient and patient agrees. All questions were answered.     Written by Yobani Delcid, as dictated by Dr. Shauna Muhammad MD.

## 2019-12-23 NOTE — PROGRESS NOTES
Type of Film: [x] CD [] FILMS  Type of Test: [] MRI [x] MAMMO  From: [facility]  Given to:   [facility]    To be Downloaded into PACS: Yes      Films are to be reviewed with the radiologist.

## 2019-12-27 NOTE — COMMUNICATION BODY
HISTORY OF PRESENT ILLNESS  Luke Tam is a 45 y.o. female. HPI  NEW patient presents for consultation at the request of Jaci Mares NP for abnormal LEFT mammogram.  She had short term follow-up of her left breast last week, and a biopsy was recommended, but cannot be scheduled until late February. Says that she has had this area biopsied twice, once in 2017 and once prior to that date (one a stereotactic biopsy and one an U/S biopsy). Pathology was benign both times; she remembers the [employer] [address] being used for one of the diagnoses. She is not feeling any breast pain, has no nipple discharge/retraction or skin changes. Does have an \"awareness\" of something in that breast.      FH - Maternal aunt had breast cancer at age 53-48. Recent imaging has been at [address] [place], BIRADS 4, suspicious.         Past Medical History:   Diagnosis Date    SLAP tear of shoulder     left       Past Surgical History:   Procedure Laterality Date    HX GYN  2011    bladder sling    HX ORTHOPAEDIC      left wrist tendon    HX ORTHOPAEDIC  Nov 2013    L SLAP tear repair    HX ORTHOPAEDIC  Nov 2013    L AC joint debridement    HX TONSIL AND ADENOIDECTOMY         Social History     Socioeconomic History    Marital status:      Spouse name: Not on file    Number of children: Not on file    Years of education: Not on file    Highest education level: Not on file   Occupational History    Not on file   Social Needs    Financial resource strain: Not on file    Food insecurity:     Worry: Not on file     Inability: Not on file    Transportation needs:     Medical: Not on file     Non-medical: Not on file   Tobacco Use    Smoking status: Never Smoker    Smokeless tobacco: Never Used   Substance and Sexual Activity    Alcohol use:  Yes    Drug use: No    Sexual activity: Yes     Partners: Male     Birth control/protection: Surgical   Lifestyle    Physical activity:     Days per week: Not on file     Minutes per session: Not on file    Stress: Not on file   Relationships    Social connections:     Talks on phone: Not on file     Gets together: Not on file     Attends Religion service: Not on file     Active member of club or organization: Not on file     Attends meetings of clubs or organizations: Not on file     Relationship status: Not on file    Intimate partner violence:     Fear of current or ex partner: Not on file     Emotionally abused: Not on file     Physically abused: Not on file     Forced sexual activity: Not on file   Other Topics Concern    Not on file   Social History Narrative    Not on file       Current Outpatient Medications on File Prior to Visit   Medication Sig Dispense Refill    levonorgestrel (MIRENA) 20 mcg/24 hours (5 yrs) 52 mg IUD Mirena 20 mcg/24 hours (5 yrs) 52 mg intrauterine device   Take by intrauterine route.  [DISCONTINUED] ibuprofen (MOTRIN) 200 mg tablet Take  by mouth.  ALPRAZolam (XANAX) 0.5 mg tablet Take 1 Tab by mouth nightly as needed for Anxiety. Max Daily Amount: 0.5 mg. 30 Tab 0    cholecalciferol (VITAMIN D3) 1,000 unit tablet Take  by mouth daily. No current facility-administered medications on file prior to visit. No Known Allergies    OB History    No obstetric history on file. Obstetric Comments   Menarche 17, LMP 12/17/19, # of children 2, age of 6st delivery 31, Hysterectomy/oophorectomy No/No Breast bx Yes, two on the LEFT breast , history of breast feeding Yes, BCP Yes, in the past, currently has IUD, Hormone therapy No               ROS  Constitutional: Negative. HENT: Negative. Eyes: Negative. Respiratory: Negative. Cardiovascular: Negative. Gastrointestinal: Negative. Genitourinary: Negative. Musculoskeletal: Negative. Skin: Negative. Neurological: Negative. Endo/Heme/Allergies: Negative. Psychiatric/Behavioral: Negative.        Physical Exam  Exam conducted with a chaperone present. Cardiovascular:      Rate and Rhythm: Normal rate and regular rhythm. Heart sounds: Normal heart sounds. Pulmonary:      Breath sounds: Normal breath sounds. Chest:      Breasts: Breasts are symmetrical.         Right: Normal. No swelling, bleeding, inverted nipple, mass, nipple discharge, skin change or tenderness. Left: Normal. No swelling, bleeding, inverted nipple, mass, nipple discharge, skin change or tenderness. Lymphadenopathy:      Cervical:      Right cervical: No superficial, deep or posterior cervical adenopathy. Left cervical: No superficial, deep or posterior cervical adenopathy. Upper Body:      Right upper body: No supraclavicular or axillary adenopathy. Left upper body: No supraclavicular or axillary adenopathy. BREAST ULTRASOUND  Indication: LEFT breast thickening 2:00  Technique: The area was scanned using a high-frequency linear-array near-field transducer  Findings: No abnormal mass, lesion, or shadowing noted; no cysts; no axillary lymphadenopathy  Impression: Normal dense breast tissue  Disposition: Will review films with radiology    ASSESSMENT and PLAN    ICD-10-CM ICD-9-CM    1. Abnormal mammogram of left breast R92.8 793.80    2. Breast thickening N64.59 611.79       New patient presents for abnormal LEFT breast mammogram, and is doing well overall. Thickening on exam at LEFT breast 2:00, with normal dense breast tissue on US. Will review films with radiology and call with recommendation. . This plan was reviewed with the patient and patient agrees. All questions were answered.     Written by Elis Archer, as dictated by Dr. Leandro Lucas MD.

## 2019-12-31 ENCOUNTER — TELEPHONE (OUTPATIENT)
Dept: SURGERY | Age: 44
End: 2019-12-31

## 2020-01-08 ENCOUNTER — TELEPHONE (OUTPATIENT)
Dept: SURGERY | Age: 45
End: 2020-01-08

## 2020-01-08 NOTE — TELEPHONE ENCOUNTER
Called patient today to see if she is getting her old films from [employer]. She just called them this morning and hopes to pick them up in the next few days. She will either bring them to [facility] or [facility] (depending on the day), and will ask that they be given to me so I can get them loaded. She was appreciative of the call.

## 2020-01-15 ENCOUNTER — DOCUMENTATION ONLY (OUTPATIENT)
Dept: SURGERY | Age: 45
End: 2020-01-15

## 2020-01-15 NOTE — PROGRESS NOTES
Type of Film: [x] CD [] FILMS  Type of Test: [] MRI [x] MAMMO  From: [facility]   Given to: [facility]  To be Downloaded into PACS:  YES      Patient dropped her old mammograms off for comparison with radiologist.

## 2020-01-22 ENCOUNTER — TELEPHONE (OUTPATIENT)
Dept: SURGERY | Age: 45
End: 2020-01-22

## 2020-01-22 DIAGNOSIS — R92.8 ABNORMAL MAMMOGRAM OF LEFT BREAST: Primary | ICD-10-CM

## 2020-02-24 ENCOUNTER — DOCUMENTATION ONLY (OUTPATIENT)
Dept: SURGERY | Age: 45
End: 2020-02-24

## 2020-02-24 NOTE — PROGRESS NOTES
Copy of old films from [facility], loaded into PACs. Copy placed in shred box to be destroyed.

## 2020-11-17 ENCOUNTER — TRANSCRIBE ORDER (OUTPATIENT)
Dept: SCHEDULING | Age: 45
End: 2020-11-17

## 2020-11-17 DIAGNOSIS — Z12.31 VISIT FOR SCREENING MAMMOGRAM: Primary | ICD-10-CM

## 2020-12-23 ENCOUNTER — TELEPHONE (OUTPATIENT)
Dept: SURGERY | Age: 45
End: 2020-12-23

## 2020-12-23 ENCOUNTER — HOSPITAL ENCOUNTER (OUTPATIENT)
Dept: MAMMOGRAPHY | Age: 45
Discharge: HOME | End: 2020-12-23
Attending: SURGERY

## 2020-12-23 DIAGNOSIS — R92.8 ABNORMAL MAMMOGRAM OF LEFT BREAST: Primary | ICD-10-CM

## 2020-12-23 DIAGNOSIS — Z12.31 VISIT FOR SCREENING MAMMOGRAM: ICD-10-CM

## 2020-12-23 NOTE — TELEPHONE ENCOUNTER
Went to [facility] mammography today for her annual screening mammogram, but [facility] said that Dr. Song Riley had wanted a diagnostic mammogram on the LEFT to follow-up an abnormal mammogram done at [address]. This was supposed to be done in June, but patient forgot about it until now. She is actually now due for both sides, so I will place the order for the BILATERAL 3D diagnostic mammogram.  She will call and schedule this at [facility]. She was very appreciative.

## 2021-01-26 ENCOUNTER — HOSPITAL ENCOUNTER (OUTPATIENT)
Dept: MAMMOGRAPHY | Age: 46
Discharge: HOME | End: 2021-01-26
Attending: SURGERY
Payer: COMMERCIAL

## 2021-01-26 DIAGNOSIS — R92.8 ABNORMAL MAMMOGRAM OF LEFT BREAST: ICD-10-CM

## 2021-01-26 PROCEDURE — 77062 BREAST TOMOSYNTHESIS BI: CPT

## 2021-01-26 PROCEDURE — 76642 ULTRASOUND BREAST LIMITED: CPT

## 2022-01-06 ENCOUNTER — TRANSCRIBE ORDER (OUTPATIENT)
Dept: SCHEDULING | Age: 47
End: 2022-01-06

## 2022-01-06 DIAGNOSIS — Z12.31 VISIT FOR SCREENING MAMMOGRAM: Primary | ICD-10-CM

## 2022-03-25 ENCOUNTER — HOSPITAL ENCOUNTER (OUTPATIENT)
Dept: MAMMOGRAPHY | Age: 47
Discharge: HOME | End: 2022-03-25
Attending: SURGERY
Payer: COMMERCIAL

## 2022-03-25 DIAGNOSIS — Z12.31 VISIT FOR SCREENING MAMMOGRAM: ICD-10-CM

## 2022-03-25 PROCEDURE — 77063 BREAST TOMOSYNTHESIS BI: CPT

## 2023-02-28 ENCOUNTER — TRANSCRIBE ORDER (OUTPATIENT)
Dept: SCHEDULING | Age: 48
End: 2023-02-28

## 2023-02-28 DIAGNOSIS — Z12.31 VISIT FOR SCREENING MAMMOGRAM: Primary | ICD-10-CM
